# Patient Record
Sex: FEMALE | Race: WHITE | Employment: FULL TIME | URBAN - METROPOLITAN AREA
[De-identification: names, ages, dates, MRNs, and addresses within clinical notes are randomized per-mention and may not be internally consistent; named-entity substitution may affect disease eponyms.]

---

## 2017-05-04 ENCOUNTER — HOSPITAL ENCOUNTER (OUTPATIENT)
Dept: PREADMISSION TESTING | Age: 57
Discharge: HOME | End: 2017-05-04
Payer: COMMERCIAL

## 2017-05-04 VITALS
DIASTOLIC BLOOD PRESSURE: 74 MMHG | WEIGHT: 240 LBS | TEMPERATURE: 97.7 F | BODY MASS INDEX: 39.99 KG/M2 | RESPIRATION RATE: 18 BRPM | HEART RATE: 51 BPM | HEIGHT: 65 IN | SYSTOLIC BLOOD PRESSURE: 119 MMHG

## 2017-05-04 LAB
ALBUMIN SERPL BCP-MCNC: 3.5 G/DL (ref 3.5–5)
ALBUMIN/GLOB SERPL: 1 {RATIO} (ref 1.1–2.2)
ALP SERPL-CCNC: 87 U/L (ref 45–117)
ALT SERPL-CCNC: 16 U/L (ref 12–78)
ANION GAP BLD CALC-SCNC: 6 MMOL/L (ref 5–15)
APPEARANCE UR: CLEAR
APTT PPP: 28.7 SEC (ref 22.1–32.5)
AST SERPL W P-5'-P-CCNC: 10 U/L (ref 15–37)
BACTERIA URNS QL MICRO: NEGATIVE /HPF
BASOPHILS # BLD AUTO: 0 K/UL (ref 0–0.1)
BASOPHILS # BLD: 0 % (ref 0–1)
BILIRUB SERPL-MCNC: 0.2 MG/DL (ref 0.2–1)
BILIRUB UR QL: NEGATIVE
BUN SERPL-MCNC: 11 MG/DL (ref 6–20)
BUN/CREAT SERPL: 14 (ref 12–20)
CALCIUM SERPL-MCNC: 9 MG/DL (ref 8.5–10.1)
CHLORIDE SERPL-SCNC: 103 MMOL/L (ref 97–108)
CO2 SERPL-SCNC: 32 MMOL/L (ref 21–32)
COLOR UR: NORMAL
CREAT SERPL-MCNC: 0.81 MG/DL (ref 0.55–1.02)
EOSINOPHIL # BLD: 0.1 K/UL (ref 0–0.4)
EOSINOPHIL NFR BLD: 1 % (ref 0–7)
EPITH CASTS URNS QL MICRO: NORMAL /LPF
ERYTHROCYTE [DISTWIDTH] IN BLOOD BY AUTOMATED COUNT: 14.7 % (ref 11.5–14.5)
GLOBULIN SER CALC-MCNC: 3.5 G/DL (ref 2–4)
GLUCOSE SERPL-MCNC: 98 MG/DL (ref 65–100)
GLUCOSE UR STRIP.AUTO-MCNC: NEGATIVE MG/DL
HCT VFR BLD AUTO: 35.8 % (ref 35–47)
HGB BLD-MCNC: 11.2 G/DL (ref 11.5–16)
HGB UR QL STRIP: NEGATIVE
HYALINE CASTS URNS QL MICRO: NORMAL /LPF (ref 0–5)
INR PPP: 1 (ref 0.9–1.1)
KETONES UR QL STRIP.AUTO: NEGATIVE MG/DL
LEUKOCYTE ESTERASE UR QL STRIP.AUTO: NEGATIVE
LYMPHOCYTES # BLD AUTO: 39 % (ref 12–49)
LYMPHOCYTES # BLD: 3.2 K/UL (ref 0.8–3.5)
MCH RBC QN AUTO: 26 PG (ref 26–34)
MCHC RBC AUTO-ENTMCNC: 31.3 G/DL (ref 30–36.5)
MCV RBC AUTO: 83.1 FL (ref 80–99)
MONOCYTES # BLD: 0.6 K/UL (ref 0–1)
MONOCYTES NFR BLD AUTO: 7 % (ref 5–13)
NEUTS SEG # BLD: 4.3 K/UL (ref 1.8–8)
NEUTS SEG NFR BLD AUTO: 53 % (ref 32–75)
NITRITE UR QL STRIP.AUTO: NEGATIVE
PH UR STRIP: 7 [PH] (ref 5–8)
PLATELET # BLD AUTO: 368 K/UL (ref 150–400)
POTASSIUM SERPL-SCNC: 4.3 MMOL/L (ref 3.5–5.1)
PROT SERPL-MCNC: 7 G/DL (ref 6.4–8.2)
PROT UR STRIP-MCNC: NEGATIVE MG/DL
PROTHROMBIN TIME: 10 SEC (ref 9–11.1)
RBC # BLD AUTO: 4.31 M/UL (ref 3.8–5.2)
RBC #/AREA URNS HPF: NORMAL /HPF (ref 0–5)
SODIUM SERPL-SCNC: 141 MMOL/L (ref 136–145)
SP GR UR REFRACTOMETRY: 1.02 (ref 1–1.03)
THERAPEUTIC RANGE,PTTT: NORMAL SECS (ref 58–77)
UROBILINOGEN UR QL STRIP.AUTO: 0.2 EU/DL (ref 0.2–1)
WBC # BLD AUTO: 8.2 K/UL (ref 3.6–11)
WBC URNS QL MICRO: NORMAL /HPF (ref 0–4)

## 2017-05-04 PROCEDURE — 36415 COLL VENOUS BLD VENIPUNCTURE: CPT | Performed by: OBSTETRICS & GYNECOLOGY

## 2017-05-04 PROCEDURE — 85025 COMPLETE CBC W/AUTO DIFF WBC: CPT | Performed by: OBSTETRICS & GYNECOLOGY

## 2017-05-04 PROCEDURE — 85730 THROMBOPLASTIN TIME PARTIAL: CPT | Performed by: OBSTETRICS & GYNECOLOGY

## 2017-05-04 PROCEDURE — 85610 PROTHROMBIN TIME: CPT | Performed by: OBSTETRICS & GYNECOLOGY

## 2017-05-04 PROCEDURE — 87086 URINE CULTURE/COLONY COUNT: CPT | Performed by: OBSTETRICS & GYNECOLOGY

## 2017-05-04 PROCEDURE — 80053 COMPREHEN METABOLIC PANEL: CPT | Performed by: OBSTETRICS & GYNECOLOGY

## 2017-05-04 PROCEDURE — 81001 URINALYSIS AUTO W/SCOPE: CPT | Performed by: OBSTETRICS & GYNECOLOGY

## 2017-05-04 RX ORDER — ALPRAZOLAM 0.5 MG/1
0.5 TABLET ORAL
COMMUNITY
End: 2022-05-09

## 2017-05-04 RX ORDER — METOPROLOL SUCCINATE 25 MG/1
12.5 TABLET, EXTENDED RELEASE ORAL
COMMUNITY
End: 2022-05-08

## 2017-05-04 RX ORDER — DICLOFENAC SODIUM 10 MG/G
GEL TOPICAL
COMMUNITY
End: 2018-01-18

## 2017-05-04 RX ORDER — LEVOCETIRIZINE DIHYDROCHLORIDE 5 MG/1
TABLET, FILM COATED ORAL
COMMUNITY
End: 2022-05-08

## 2017-05-04 RX ORDER — DICYCLOMINE HYDROCHLORIDE 10 MG/1
10 CAPSULE ORAL 3 TIMES DAILY
COMMUNITY
End: 2018-01-18

## 2017-05-04 RX ORDER — DEXLANSOPRAZOLE 60 MG/1
CAPSULE, DELAYED RELEASE ORAL DAILY
COMMUNITY
End: 2022-05-08

## 2017-05-04 RX ORDER — CITALOPRAM 40 MG/1
40 TABLET, FILM COATED ORAL EVERY EVENING
COMMUNITY
End: 2022-07-19 | Stop reason: ALTCHOICE

## 2017-05-04 RX ORDER — TEMAZEPAM 30 MG/1
30 CAPSULE ORAL
COMMUNITY

## 2017-05-05 LAB
BACTERIA SPEC CULT: NORMAL
CC UR VC: NORMAL
SERVICE CMNT-IMP: NORMAL

## 2017-05-17 ENCOUNTER — ANESTHESIA EVENT (OUTPATIENT)
Dept: SURGERY | Age: 57
End: 2017-05-17
Payer: COMMERCIAL

## 2017-05-18 ENCOUNTER — HOSPITAL ENCOUNTER (OUTPATIENT)
Age: 57
Setting detail: OUTPATIENT SURGERY
Discharge: HOME | End: 2017-05-18
Attending: OBSTETRICS & GYNECOLOGY | Admitting: OBSTETRICS & GYNECOLOGY
Payer: COMMERCIAL

## 2017-05-18 ENCOUNTER — ANESTHESIA (OUTPATIENT)
Dept: SURGERY | Age: 57
End: 2017-05-18
Payer: COMMERCIAL

## 2017-05-18 ENCOUNTER — APPOINTMENT (OUTPATIENT)
Dept: GENERAL RADIOLOGY | Age: 57
End: 2017-05-18
Attending: OBSTETRICS & GYNECOLOGY
Payer: COMMERCIAL

## 2017-05-18 VITALS
SYSTOLIC BLOOD PRESSURE: 114 MMHG | HEART RATE: 59 BPM | WEIGHT: 240 LBS | OXYGEN SATURATION: 95 % | DIASTOLIC BLOOD PRESSURE: 87 MMHG | RESPIRATION RATE: 17 BRPM | HEIGHT: 65 IN | TEMPERATURE: 98 F | BODY MASS INDEX: 39.99 KG/M2

## 2017-05-18 PROCEDURE — 77030031139 HC SUT VCRL2 J&J -A: Performed by: OBSTETRICS & GYNECOLOGY

## 2017-05-18 PROCEDURE — 77030032490 HC SLV COMPR SCD KNE COVD -B: Performed by: OBSTETRICS & GYNECOLOGY

## 2017-05-18 PROCEDURE — 74011000250 HC RX REV CODE- 250

## 2017-05-18 PROCEDURE — 74011250636 HC RX REV CODE- 250/636: Performed by: OBSTETRICS & GYNECOLOGY

## 2017-05-18 PROCEDURE — 76010000149 HC OR TIME 1 TO 1.5 HR: Performed by: OBSTETRICS & GYNECOLOGY

## 2017-05-18 PROCEDURE — 76060000033 HC ANESTHESIA 1 TO 1.5 HR: Performed by: OBSTETRICS & GYNECOLOGY

## 2017-05-18 PROCEDURE — 76210000020 HC REC RM PH II FIRST 0.5 HR: Performed by: OBSTETRICS & GYNECOLOGY

## 2017-05-18 PROCEDURE — C1787 PATIENT PROGR, NEUROSTIM: HCPCS | Performed by: OBSTETRICS & GYNECOLOGY

## 2017-05-18 PROCEDURE — 74011250636 HC RX REV CODE- 250/636

## 2017-05-18 PROCEDURE — 77030012020 HC ANTENA NEURSTM MEDT -B: Performed by: OBSTETRICS & GYNECOLOGY

## 2017-05-18 PROCEDURE — 73501 X-RAY EXAM HIP UNI 1 VIEW: CPT

## 2017-05-18 PROCEDURE — 74011000258 HC RX REV CODE- 258: Performed by: OBSTETRICS & GYNECOLOGY

## 2017-05-18 PROCEDURE — C1767 GENERATOR, NEURO NON-RECHARG: HCPCS | Performed by: OBSTETRICS & GYNECOLOGY

## 2017-05-18 PROCEDURE — 74011000250 HC RX REV CODE- 250: Performed by: OBSTETRICS & GYNECOLOGY

## 2017-05-18 PROCEDURE — 77030002933 HC SUT MCRYL J&J -A: Performed by: OBSTETRICS & GYNECOLOGY

## 2017-05-18 PROCEDURE — 74011250637 HC RX REV CODE- 250/637

## 2017-05-18 PROCEDURE — C1778 LEAD, NEUROSTIMULATOR: HCPCS | Performed by: OBSTETRICS & GYNECOLOGY

## 2017-05-18 PROCEDURE — 76000 FLUOROSCOPY <1 HR PHYS/QHP: CPT

## 2017-05-18 PROCEDURE — 77030011640 HC PAD GRND REM COVD -A: Performed by: OBSTETRICS & GYNECOLOGY

## 2017-05-18 PROCEDURE — 77030013079 HC BLNKT BAIR HGGR 3M -A: Performed by: NURSE ANESTHETIST, CERTIFIED REGISTERED

## 2017-05-18 PROCEDURE — 77030018846 HC SOL IRR STRL H20 ICUM -A: Performed by: OBSTETRICS & GYNECOLOGY

## 2017-05-18 PROCEDURE — 76210000000 HC OR PH I REC 2 TO 2.5 HR: Performed by: OBSTETRICS & GYNECOLOGY

## 2017-05-18 PROCEDURE — C1894 INTRO/SHEATH, NON-LASER: HCPCS | Performed by: OBSTETRICS & GYNECOLOGY

## 2017-05-18 DEVICE — GENERATOR INTERSTIM X --: Type: IMPLANTABLE DEVICE | Site: BUTTOCKS | Status: FUNCTIONAL

## 2017-05-18 RX ORDER — SODIUM CHLORIDE, SODIUM LACTATE, POTASSIUM CHLORIDE, CALCIUM CHLORIDE 600; 310; 30; 20 MG/100ML; MG/100ML; MG/100ML; MG/100ML
INJECTION, SOLUTION INTRAVENOUS
Status: DISCONTINUED | OUTPATIENT
Start: 2017-05-18 | End: 2017-05-18 | Stop reason: HOSPADM

## 2017-05-18 RX ORDER — ONDANSETRON 2 MG/ML
INJECTION INTRAMUSCULAR; INTRAVENOUS AS NEEDED
Status: DISCONTINUED | OUTPATIENT
Start: 2017-05-18 | End: 2017-05-18 | Stop reason: HOSPADM

## 2017-05-18 RX ORDER — MORPHINE SULFATE 10 MG/ML
2 INJECTION, SOLUTION INTRAMUSCULAR; INTRAVENOUS
Status: DISCONTINUED | OUTPATIENT
Start: 2017-05-18 | End: 2017-05-18 | Stop reason: HOSPADM

## 2017-05-18 RX ORDER — FENTANYL CITRATE 50 UG/ML
25 INJECTION, SOLUTION INTRAMUSCULAR; INTRAVENOUS
Status: DISCONTINUED | OUTPATIENT
Start: 2017-05-18 | End: 2017-05-18 | Stop reason: HOSPADM

## 2017-05-18 RX ORDER — LIDOCAINE HYDROCHLORIDE 10 MG/ML
0.1 INJECTION, SOLUTION EPIDURAL; INFILTRATION; INTRACAUDAL; PERINEURAL AS NEEDED
Status: DISCONTINUED | OUTPATIENT
Start: 2017-05-18 | End: 2017-05-18 | Stop reason: HOSPADM

## 2017-05-18 RX ORDER — LIDOCAINE HYDROCHLORIDE AND EPINEPHRINE 10; 10 MG/ML; UG/ML
INJECTION, SOLUTION INFILTRATION; PERINEURAL AS NEEDED
Status: DISCONTINUED | OUTPATIENT
Start: 2017-05-18 | End: 2017-05-18 | Stop reason: HOSPADM

## 2017-05-18 RX ORDER — SODIUM CHLORIDE 0.9 % (FLUSH) 0.9 %
5-10 SYRINGE (ML) INJECTION AS NEEDED
Status: DISCONTINUED | OUTPATIENT
Start: 2017-05-18 | End: 2017-05-18 | Stop reason: HOSPADM

## 2017-05-18 RX ORDER — HYDROMORPHONE HYDROCHLORIDE 1 MG/ML
0.2 INJECTION, SOLUTION INTRAMUSCULAR; INTRAVENOUS; SUBCUTANEOUS
Status: DISCONTINUED | OUTPATIENT
Start: 2017-05-18 | End: 2017-05-18 | Stop reason: HOSPADM

## 2017-05-18 RX ORDER — SODIUM CHLORIDE 0.9 % (FLUSH) 0.9 %
5-10 SYRINGE (ML) INJECTION EVERY 8 HOURS
Status: DISCONTINUED | OUTPATIENT
Start: 2017-05-18 | End: 2017-05-18 | Stop reason: HOSPADM

## 2017-05-18 RX ORDER — SODIUM CHLORIDE, SODIUM LACTATE, POTASSIUM CHLORIDE, CALCIUM CHLORIDE 600; 310; 30; 20 MG/100ML; MG/100ML; MG/100ML; MG/100ML
50 INJECTION, SOLUTION INTRAVENOUS CONTINUOUS
Status: DISCONTINUED | OUTPATIENT
Start: 2017-05-18 | End: 2017-05-18 | Stop reason: HOSPADM

## 2017-05-18 RX ORDER — PROPOFOL 10 MG/ML
INJECTION, EMULSION INTRAVENOUS AS NEEDED
Status: DISCONTINUED | OUTPATIENT
Start: 2017-05-18 | End: 2017-05-18 | Stop reason: HOSPADM

## 2017-05-18 RX ORDER — SCOLOPAMINE TRANSDERMAL SYSTEM 1 MG/1
PATCH, EXTENDED RELEASE TRANSDERMAL AS NEEDED
Status: DISCONTINUED | OUTPATIENT
Start: 2017-05-18 | End: 2017-05-18 | Stop reason: HOSPADM

## 2017-05-18 RX ORDER — METRONIDAZOLE 500 MG/100ML
500 INJECTION, SOLUTION INTRAVENOUS
Status: COMPLETED | OUTPATIENT
Start: 2017-05-18 | End: 2017-05-18

## 2017-05-18 RX ORDER — FENTANYL CITRATE 50 UG/ML
INJECTION, SOLUTION INTRAMUSCULAR; INTRAVENOUS AS NEEDED
Status: DISCONTINUED | OUTPATIENT
Start: 2017-05-18 | End: 2017-05-18 | Stop reason: HOSPADM

## 2017-05-18 RX ORDER — MIDAZOLAM HYDROCHLORIDE 1 MG/ML
INJECTION, SOLUTION INTRAMUSCULAR; INTRAVENOUS AS NEEDED
Status: DISCONTINUED | OUTPATIENT
Start: 2017-05-18 | End: 2017-05-18 | Stop reason: HOSPADM

## 2017-05-18 RX ORDER — DEXMEDETOMIDINE HYDROCHLORIDE 4 UG/ML
INJECTION, SOLUTION INTRAVENOUS AS NEEDED
Status: DISCONTINUED | OUTPATIENT
Start: 2017-05-18 | End: 2017-05-18 | Stop reason: HOSPADM

## 2017-05-18 RX ORDER — PROPOFOL 10 MG/ML
INJECTION, EMULSION INTRAVENOUS
Status: DISCONTINUED | OUTPATIENT
Start: 2017-05-18 | End: 2017-05-18 | Stop reason: HOSPADM

## 2017-05-18 RX ORDER — GENTAMICIN SULFATE 80 MG/100ML
80 INJECTION, SOLUTION INTRAVENOUS
Status: DISCONTINUED | OUTPATIENT
Start: 2017-05-18 | End: 2017-05-18 | Stop reason: SDUPTHER

## 2017-05-18 RX ORDER — ONDANSETRON 2 MG/ML
4 INJECTION INTRAMUSCULAR; INTRAVENOUS AS NEEDED
Status: DISCONTINUED | OUTPATIENT
Start: 2017-05-18 | End: 2017-05-18 | Stop reason: HOSPADM

## 2017-05-18 RX ADMIN — DEXMEDETOMIDINE HYDROCHLORIDE 4 MCG: 4 INJECTION, SOLUTION INTRAVENOUS at 13:39

## 2017-05-18 RX ADMIN — FENTANYL CITRATE 25 MCG: 50 INJECTION, SOLUTION INTRAMUSCULAR; INTRAVENOUS at 13:13

## 2017-05-18 RX ADMIN — DEXMEDETOMIDINE HYDROCHLORIDE 4 MCG: 4 INJECTION, SOLUTION INTRAVENOUS at 13:09

## 2017-05-18 RX ADMIN — SODIUM CHLORIDE, SODIUM LACTATE, POTASSIUM CHLORIDE, CALCIUM CHLORIDE: 600; 310; 30; 20 INJECTION, SOLUTION INTRAVENOUS at 13:00

## 2017-05-18 RX ADMIN — PROPOFOL 20 MG: 10 INJECTION, EMULSION INTRAVENOUS at 13:09

## 2017-05-18 RX ADMIN — FENTANYL CITRATE 25 MCG: 50 INJECTION, SOLUTION INTRAMUSCULAR; INTRAVENOUS at 13:09

## 2017-05-18 RX ADMIN — DEXMEDETOMIDINE HYDROCHLORIDE 4 MCG: 4 INJECTION, SOLUTION INTRAVENOUS at 13:15

## 2017-05-18 RX ADMIN — DEXMEDETOMIDINE HYDROCHLORIDE 4 MCG: 4 INJECTION, SOLUTION INTRAVENOUS at 13:37

## 2017-05-18 RX ADMIN — DEXMEDETOMIDINE HYDROCHLORIDE 4 MCG: 4 INJECTION, SOLUTION INTRAVENOUS at 13:11

## 2017-05-18 RX ADMIN — FENTANYL CITRATE 25 MCG: 50 INJECTION, SOLUTION INTRAMUSCULAR; INTRAVENOUS at 13:38

## 2017-05-18 RX ADMIN — MIDAZOLAM HYDROCHLORIDE 2 MG: 1 INJECTION, SOLUTION INTRAMUSCULAR; INTRAVENOUS at 13:00

## 2017-05-18 RX ADMIN — METRONIDAZOLE 500 MG: 500 INJECTION, SOLUTION INTRAVENOUS at 13:22

## 2017-05-18 RX ADMIN — DEXMEDETOMIDINE HYDROCHLORIDE 4 MCG: 4 INJECTION, SOLUTION INTRAVENOUS at 13:35

## 2017-05-18 RX ADMIN — FENTANYL CITRATE 25 MCG: 50 INJECTION, SOLUTION INTRAMUSCULAR; INTRAVENOUS at 13:40

## 2017-05-18 RX ADMIN — PROPOFOL 20 MG: 10 INJECTION, EMULSION INTRAVENOUS at 13:11

## 2017-05-18 RX ADMIN — DEXMEDETOMIDINE HYDROCHLORIDE 4 MCG: 4 INJECTION, SOLUTION INTRAVENOUS at 13:13

## 2017-05-18 RX ADMIN — ONDANSETRON 4 MG: 2 INJECTION INTRAMUSCULAR; INTRAVENOUS at 13:40

## 2017-05-18 RX ADMIN — DEXMEDETOMIDINE HYDROCHLORIDE 4 MCG: 4 INJECTION, SOLUTION INTRAVENOUS at 13:17

## 2017-05-18 RX ADMIN — PROPOFOL 50 MCG/KG/MIN: 10 INJECTION, EMULSION INTRAVENOUS at 13:11

## 2017-05-18 RX ADMIN — SCOLOPAMINE TRANSDERMAL SYSTEM 1 PATCH: 1 PATCH, EXTENDED RELEASE TRANSDERMAL at 12:16

## 2017-05-18 RX ADMIN — PROPOFOL 20 MG: 10 INJECTION, EMULSION INTRAVENOUS at 13:13

## 2017-05-18 RX ADMIN — GENTAMICIN SULFATE 380 MG: 40 INJECTION, SOLUTION INTRAMUSCULAR; INTRAVENOUS at 13:13

## 2017-05-18 NOTE — BRIEF OP NOTE
BRIEF OPERATIVE NOTE    Date of Procedure: 5/18/2017   Preoperative Diagnosis: Overactive bladder  Postoperative Diagnosis: Overactive bladder  Procedure(s):  INTERSTEM COMPLETE RIGHT SIDE  Surgeon(s) and Role:     * Cristiana Arriola MD - Primary         Assistant Staff:       Surgical Staff:  Circ-1: Sapphire Amanda RN  Scrub Tech-1: Yazan Ashton  Surg Asst-1: Joel Victoria RN  Event Time In   Incision Start 1340   Incision Close 1407     Anesthesia: MAC   Estimated Blood Loss: 5 cc  Specimens: * No specimens in log *   Findings: as above   Complications: none  Implants:   Implant Name Type Inv.  Item Serial No.  Lot No. LRB No. Used Action   [id number] SNS lead 28cm   N/A [employer] [id number] Right 1 Implanted   GENERATOR INTERSTIM II IPG --  - [id number]   GENERATOR INTERSTIM II IPG --  [id number] [employer]   Left 1 Implanted

## 2017-05-18 NOTE — IP AVS SNAPSHOT
[address] [facility] [address] [place] [address] 
[phone] Patient: Cristi Cerna MRN: [id number] :1960 You are allergic to the following Allergen Reactions Fish Containing Products Other (comments) R/T ALLERGY TESTING-NEVER HAD REACTION Iodine Rash Pcn (Penicillins) Rash Vancomycin Rash Recent Documentation Height Weight BMI OB Status Smoking Status 1.651 m 108.9 kg 39.94 kg/m2 Hysterectomy Never Smoker Emergency Contacts Name Discharge Info Relation Home Work Mobile Connor Meza DISCHARGE CAREGIVER [3] Spouse [3] [phone] About your hospitalization You were admitted on:  May 18, 2017 [facility] You were discharged on:  May 18, 2017 Unit phone number:  [phone] Why you were hospitalized Your primary diagnosis was:  Not on File Providers Seen During Your Hospitalizations Provider Role Specialty Primary office phone Nikhil Santos MD Attending Provider Obstetrics & Gynecology [phone] Your Primary Care Physician (PCP) Primary Care Physician Office Phone Office Fax Dana [phone] Follow-up Information Follow up With Details Comments Contact Info [place]   [address] [address]71 [address] [address] [phone] Nikhil Santos MD Schedule an appointment as soon as possible for a visit in 1 week(s) as directed 200 [facility] [address] [place] [address] 
[phone] Current Discharge Medication List  
  
CONTINUE these medications which have NOT CHANGED Dose & Instructions Dispensing Information Comments Morning Noon Evening Bedtime Cholecalciferol (Vitamin D3) 50,000 unit Cap Your last dose was: Your next dose is: Take  by mouth every seven (7) days. ON FRIDAYS Refills:  0  
     
   
   
   
  
 citalopram 40 mg tablet Commonly known as:  Jim Acosta Your last dose was: Your next dose is:    
   
   
 Dose:  40 mg Take 40 mg by mouth every evening. Refills:  0 DEXILANT 60 mg Cpdb Generic drug:  Dexlansoprazole Your last dose was: Your next dose is: Take  by mouth daily. Refills:  0  
     
   
   
   
  
 dicyclomine 10 mg capsule Commonly known as:  BENTYL Your last dose was: Your next dose is:    
   
   
 Dose:  10 mg Take 10 mg by mouth three (3) times daily. Refills:  0  
     
   
   
   
  
 levocetirizine 5 mg tablet Commonly known as:  Joretta Lock Your last dose was: Your next dose is: Take  by mouth nightly. Refills:  0  
     
   
   
   
  
 levothyroxine 100 mcg tablet Commonly known as:  SYNTHROID Your last dose was: Your next dose is:    
   
   
 Dose:  100 mcg Take 100 mcg by mouth nightly. Refills:  0  
     
   
   
   
  
 linaclotide 145 mcg Cap capsule Commonly known as:  Joan Castro Your last dose was: Your next dose is:    
   
   
 Dose:  145 mcg Take 145 mcg by mouth as needed. Refills:  0  
     
   
   
   
  
 lisinopril-hydroCHLOROthiazide 10-12.5 mg per tablet Commonly known as:  Gerdavid Puff Your last dose was: Your next dose is: Take  by mouth daily. Refills:  0  
     
   
   
   
  
 metoprolol succinate 25 mg XL tablet Commonly known as:  TOPROL-XL Your last dose was: Your next dose is:    
   
   
 Dose:  12.5 mg Take 12.5 mg by mouth daily (after dinner). Refills:  0  
     
   
   
   
  
 montelukast 10 mg tablet Commonly known as:  SINGULAIR Your last dose was: Your next dose is:    
   
   
 Dose:  10 mg Take 10 mg by mouth nightly. Refills:  0 QUEtiapine 50 mg tablet Commonly known as:  SEROquel Your last dose was: Your next dose is:    
   
   
 Dose:  50 mg Take 50 mg by mouth nightly. Take at Bedtime as needed for sleep. Refills:  0  
     
   
   
   
  
 temazepam 30 mg capsule Commonly known as:  RESTORIL Your last dose was: Your next dose is:    
   
   
 Dose:  30 mg Take 30 mg by mouth nightly. Refills:  0 VOLTAREN 1 % Gel Generic drug:  diclofenac Your last dose was: Your next dose is:    
   
   
 Apply  to affected area every six (6) hours as needed. Refills:  0  
     
   
   
   
  
 XANAX 0.5 mg tablet Generic drug:  ALPRAZolam  
   
Your last dose was: Your next dose is:    
   
   
 Dose:  0.5 mg Take 0.5 mg by mouth two (2) times daily as needed for Anxiety (PT MAY TAKE 1-2 TABS DAILY AS NEEDED). Refills:  0 Discharge Instructions DISCHARGE SUMMARY from Nurse PATIENT INSTRUCTIONS: 
 
After general anesthesia or intravenous sedation, for 24 hours or while taking prescription Narcotics: · Limit your activities · Do not drive and operate hazardous machinery · Do not make important personal or business decisions · Do  not drink alcoholic beverages · If you have not urinated within 8 hours after discharge, please contact your surgeon on call. Report the following to your surgeon: 
· Excessive pain, swelling, redness or odor of or around the surgical area · Temperature over 100.5 · Nausea and vomiting lasting longer than 4 hours or if unable to take medications · Any signs of decreased circulation or nerve impairment to extremity: change in color, persistent  numbness, tingling, coldness or increase pain · Any questions The discharge information has been reviewed with the patient and spouse. The patient and spouse verbalized understanding. Discharge medications reviewed with the patient and spouse and appropriate educational materials and side effects teaching were provided. Gynecology Discharge Summary Name: Lisa Porras MRN: [id number]  SSN: [id number] YOB: 1960  Age: 55 y.o. Sex: female Admit Date: 5/18/2017 Discharge Date: 5/18/2017 Admitting Physician: Jacobo Pierson MD  
 
Discharge Physician: Bambi Jeff RN  
 
* Admission Diagnoses: URINARY INCONTINENCE * Discharge Diagnoses:  
Hospital Problems as of 5/18/2017  Date Reviewed: 5/26/2016 None * Procedures: Interstem Placement Consults: None * Discharge Disposition: Home Discharge Medications: * Follow-up Care/Patient Instructions: Activity:  No driving while taking pain medication. No heavy lifting for 2 weeks. You may shower in 48 hours. Diet: Resume pre-hospital diet Wound Care: Keep wound clean and dry. Remove tegaderm 48 hours. Follow-up Information None Signed By:  Bambi Jeff RN May 18, 2017 Discharge Instructions Attachments/References MEFS - OXYCODONE/ACETAMINOPHEN (PERCOCET, ROXICET) - (BY MOUTH) (ENGLISH) Discharge Orders None Introducing [facility] HE[facility]! [employer] introduces [employer] patient portal. Now you can access parts of your medical record, email your doctor's office, and request medication refills online. 1. In your internet browser, go to [email or web address] 2. Click on the First Time User? Click Here link in the Sign In box. You will see the New Member Sign Up page. 3. Enter your D[employer]zy[employer] Access Code exactly as it appears below. You will not need to use this code after youve completed the sign-up process. If you do not sign up before the expiration date, you must request a new code. · [employer] Access Code: [email or web address] Expires: 6/20/2017  9:29 AM 
 
 4. Enter the last four digits of your Social Security Number (xxxx) and Date of Birth (mm/dd/yyyy) as indicated and click Submit. You will be taken to the next sign-up page. 5. Create a Game9z ID. This will be your Game9z login ID and cannot be changed, so think of one that is secure and easy to remember. 6. Create a Game9z password. You can change your password at any time. 7. Enter your Password Reset Question and Answer. This can be used at a later time if you forget your password. 8. Enter your e-mail address. You will receive e-mail notification when new information is available in [address] 9. Click Sign Up. You can now view and download portions of your medical record. 10. Click the Download Summary menu link to download a portable copy of your medical information. If you have questions, please visit the Frequently Asked Questions section of the Game9z website. Remember, Game9z is NOT to be used for urgent needs. For medical emergencies, dial 911. Now available from your iPhone and Android! General Information Please provide this summary of care documentation to your next provider. Patient Signature:  ____________________________________________________________ Date:  ____________________________________________________________  
  
[facility] Provider Signature:  ____________________________________________________________ Date:  ____________________________________________________________ More Information Oxycodone/Acetaminophen (Percocet, Roxicet) - (By mouth) Why this medicine is used:  
Treats pain. This medicine contains a narcotic pain reliever. Contact a nurse or doctor right away if you have: 
· Extreme weakness, shallow breathing, slow heartbeat · Sweating or cold, clammy skin · Skin blisters, rash, or peeling Common side effects: 
· Constipation · Nausea, vomiting · Tiredness © 2017 [address] Information is for End User's use only and may not be sold, redistributed or otherwise used for commercial purposes.

## 2017-05-18 NOTE — PERIOP NOTES
Patient interviewed in holding area, identity and procedure verified. SCDs applied prior to anesthesia induction. Pressure preset. Patient placed herself in the prone position on top of pillows on the OR table. Patient secured to the table. Family member contacted at beginning of procedure. Sterile water to sterile field for irrigation.

## 2017-05-18 NOTE — ANESTHESIA PREPROCEDURE EVALUATION
Anesthetic History     PONV          Review of Systems / Medical History  Patient summary reviewed, nursing notes reviewed and pertinent labs reviewed    Pulmonary                   Neuro/Psych         Psychiatric history    Comments: anxiety Cardiovascular    Hypertension                Comments: Normal sinus rhythm   Normal ECG   When compared with ECG of 12-JAN-2010 13:46,   No significant change was found   Confirmed by Rosy De La Garza MD, Kajal Graff ([id number]) on 5/14/2016 5:39:46 PM    GI/Hepatic/Renal     GERD      PUD     Endo/Other      Hypothyroidism       Other Findings            Physical Exam    Airway  Mallampati: II  TM Distance: > 6 cm  Neck ROM: normal range of motion   Mouth opening: Normal     Cardiovascular    Rhythm: regular  Rate: normal         Dental  No notable dental hx       Pulmonary  Breath sounds clear to auscultation               Abdominal  Abdominal exam normal       Other Findings            Anesthetic Plan    ASA: 2  Anesthesia type: MAC          Induction: Intravenous  Anesthetic plan and risks discussed with: Patient

## 2017-05-18 NOTE — DISCHARGE INSTRUCTIONS
DISCHARGE SUMMARY from Nurse        PATIENT INSTRUCTIONS:    After general anesthesia or intravenous sedation, for 24 hours or while taking prescription Narcotics:  · Limit your activities  · Do not drive and operate hazardous machinery  · Do not make important personal or business decisions  · Do  not drink alcoholic beverages  · If you have not urinated within 8 hours after discharge, please contact your surgeon on call. Report the following to your surgeon:  · Excessive pain, swelling, redness or odor of or around the surgical area  · Temperature over 100.5  · Nausea and vomiting lasting longer than 4 hours or if unable to take medications  · Any signs of decreased circulation or nerve impairment to extremity: change in color, persistent  numbness, tingling, coldness or increase pain  · Any questions    The discharge information has been reviewed with the patient and spouse. The patient and spouse verbalized understanding. Discharge medications reviewed with the patient and spouse and appropriate educational materials and side effects teaching were provided. Gynecology Discharge Summary     Name: Justyn Kothari MRN: [id number]  SSN: [id number]    YOB: 1960  Age: 55 y.o. Sex: female      Admit Date: 5/18/2017    Discharge Date: 5/18/2017      Admitting Physician: Killian Solomon MD     Discharge Physician: Shant Lugo RN     * Admission Diagnoses: URINARY INCONTINENCE    * Discharge Diagnoses:   Hospital Problems as of 5/18/2017  Date Reviewed: 5/26/2016    None           * Procedures: Interstem Placement    Consults: None      * Discharge Disposition: Home    Discharge Medications:          * Follow-up Care/Patient Instructions: Activity:  No driving while taking pain medication. No heavy lifting for 2 weeks. You may shower in 48 hours. Diet: Resume pre-hospital diet  Wound Care: Keep wound clean and dry. Remove tegaderm 48 hours.     Follow-up Information     None Signed By:  Shant Lugo RN     May 18, 2017

## 2017-05-18 NOTE — ANESTHESIA POSTPROCEDURE EVALUATION
Post-Anesthesia Evaluation and Assessment    Patient: Justyn Kothari MRN: [id number]  SSN: [id number]    YOB: 1960  Age: 55 y.o. Sex: female       Cardiovascular Function/Vital Signs  Visit Vitals    /50 (BP 1 Location: Right arm, BP Patient Position: At rest)    Pulse 69    Temp 36.8 °C (98.3 °F)    Resp 17    Ht 5' 5\" (1.651 m)    Wt 108.9 kg (240 lb)    SpO2 97%    BMI 39.94 kg/m2       Patient is status post total IV anesthesia anesthesia for Procedure(s):  INTERSTEM COMPLETE RIGHT SIDE. Nausea/Vomiting: None    Postoperative hydration reviewed and adequate. Pain:  Pain Scale 1: Numeric (0 - 10) (05/18/17 1420)  Pain Intensity 1: 0 (05/18/17 1420)   Managed    Neurological Status:   Neuro (WDL): Within Defined Limits (05/18/17 1420)  Neuro  LUE Motor Response: Purposeful (05/18/17 1420)  LLE Motor Response: Purposeful (05/18/17 1420)  RUE Motor Response: Purposeful (05/18/17 1420)  RLE Motor Response: Purposeful (05/18/17 1420)   At baseline    Mental Status and Level of Consciousness: Arousable    Pulmonary Status:   O2 Device: CO2 nasal cannula (05/18/17 1420)   Adequate oxygenation and airway patent    Complications related to anesthesia: None    Post-anesthesia assessment completed.  No concerns    Signed By: Saman Cid MD     May 18, 2017

## 2017-05-19 NOTE — OP NOTES
[address] [facility] [address], [address]   OP NOTE       Name:  Alexsandra Munoz   MR#:  [id number]   :  1960   Account #:  [de-identified]    Surgery Date:  2017   Date of Adm:  2017       DATE OF OPERATION: 2017. PREOPERATIVE DIAGNOSIS: Overactive bladder, unresponsive to   conservative therapy. POSTOPERATIVE DIAGNOSIS: Overactive bladder, unresponsive to   conservative therapy. PROCEDURE PERFORMED: InterStim complete to the right side. SURGEON: Lisa Wren. MD [place].    ESTIMATED BLOOD LOSS: 5 mL. SPECIMENS REMOVED: None. COMPLICATIONS: None. ANESTHESIA: Sedation and local.    DESCRIPTION OF PROCEDURE: The patient was taken to the OR,   sedated, placed in the prone position, and sterilely prepped in the   usual fashion with her toes allowed to dangle freely over the edges of   the table. Buttocks were taped apart. The S3 foramen was then identified with a combination of superficial   landmarks and cross-hairs technique under fluoroscopy. Attention was turned towards the right side as the patient had had a   superior clinical response on peripheral nerve evaluation on the right   side. The test needle was placed into the S3 foramen and stimulated. There was noted to be good anal yana and great toe flexion   consistent with good localization of the S1 nerve root. The stylet of the   needle was withdrawn. The guidewire was placed and the needle was   withdrawn. The incision was expanded with a 15 blade. The dilator was   placed to its proper depth under fluoroscopic guidance. The stylet of   the dilator and guidewire were removed and the permanent lead wire   was placed under fluoroscopic guidance into its proper position and   stimulated. There was noted to be good anal yana and great toe   flexion in all 4 leads, again consistent with good localization of the S3   nerve root.     The styloid of the permanent lead wire and the dilator were removed   under live fluoroscopy, ensuring stable position of the permanent lead   wire. A 3 cm transverse incision was then created inferior to the iliac   crest on the left upper buttock after being infiltrated with 1% lidocaine   and epinephrine. This was carried down to the level of the gluteal   fascia and extended 3 cm x 3 cm at that level. The distal end of the   lead was then brought into the buttock pocket with the tunneling tool. The distal end was attached to the IPG and placed into its proper   orientation within the pocket. Intraoperative programming was performed, the case IPG and lead   were all noted to be functioning appropriately. The wound was   irrigated. The dermis was closed with interrupted 2-0 Vicryl suture. Both skin incisions were closed with 4-0 Monocryl and covered in   Dermabond. She was returned to the supine position in stable   condition, having tolerated the procedure well. She was transferred to   postanesthesia care. Instrument and lap counts were correct x2.         MD POORNIMA Brooks / Brent Jeffery   D:  05/18/2017   18:15   T:  05/19/2017   02:12   Job #:  [id number]     Tamra Loyd MD, [facility], [place], [facility]     Lakeisha Larsen MD, [address], [place], [address] [facility]

## 2018-01-18 ENCOUNTER — HOSPITAL ENCOUNTER (EMERGENCY)
Age: 58
Discharge: HOME | End: 2018-01-18
Attending: FAMILY MEDICINE

## 2018-01-18 VITALS
TEMPERATURE: 97.5 F | RESPIRATION RATE: 16 BRPM | SYSTOLIC BLOOD PRESSURE: 129 MMHG | DIASTOLIC BLOOD PRESSURE: 78 MMHG | OXYGEN SATURATION: 97 % | WEIGHT: 240 LBS | HEIGHT: 65 IN | HEART RATE: 65 BPM | BODY MASS INDEX: 39.99 KG/M2

## 2018-01-18 DIAGNOSIS — R05.9 COUGH: ICD-10-CM

## 2018-01-18 DIAGNOSIS — R06.2 WHEEZE: ICD-10-CM

## 2018-01-18 DIAGNOSIS — J01.90 ACUTE BACTERIAL SINUSITIS: Primary | ICD-10-CM

## 2018-01-18 DIAGNOSIS — B96.89 ACUTE BACTERIAL SINUSITIS: Primary | ICD-10-CM

## 2018-01-18 RX ORDER — HYOSCYAMINE SULFATE 0.12 MG/1
0.12 TABLET SUBLINGUAL
COMMUNITY
End: 2022-05-08

## 2018-01-18 RX ORDER — PREDNISONE 20 MG/1
TABLET ORAL
Qty: 8 TAB | Refills: 0 | Status: SHIPPED | OUTPATIENT
Start: 2018-01-18 | End: 2022-07-19 | Stop reason: ALTCHOICE

## 2018-01-18 RX ORDER — DOXYCYCLINE HYCLATE 100 MG
100 TABLET ORAL 2 TIMES DAILY
Qty: 14 TAB | Refills: 0 | Status: SHIPPED | OUTPATIENT
Start: 2018-01-18 | End: 2018-01-25

## 2018-01-18 NOTE — LETTER
[facility] 
[address] [address] 
[phone] Work/School Note Date: 1/18/2018 To Whom It May concern: 
 
Edvin Allan was seen and treated today in the emergency room by the following provider(s): 
Nurse Practitioner: Miko Ferrara NP. Edvin Allan may return to work 1- Sincerely, Deon Flowers

## 2018-01-18 NOTE — UC PROVIDER NOTE
HPI Comments:   Here for sinus infection. Sinus pain pressure both cheeks with nasal discharge and congestion for 8 or 9 days. Tried mucinex without any relief. Associated dry to wheezy cough. No asthma history but promotes allergies sometimes makes her wheeze. No fever, chills body aches or severe headache. Symptoms constant without identified aggravating or alleviating factors. Hx significant for: GERD, allergic rhinitis, IBS, HTN, hashimotos, arrythmia    Patient is a 65 y.o. female presenting with cold symptoms. Cold Symptoms    Pertinent negatives include no chest pain, no nausea, no vomiting and no wheezing.         Past Medical History:   Diagnosis Date    Adverse effect of anesthesia     SOMETIMES SLOW TO WAKE UP     Anxiety     Arrhythmia     MURMUR, PALPITATIONS    Arthritis     OSTEOARTHRITIS IN HANDS    Autoimmune disease ([address]     HASHIMOTO'S    GERD (gastroesophageal reflux disease)     Heart murmur     HTN (hypertension)     IBS (irritable bowel syndrome)     Ileus (HCC) 2017    Insomnia     Nausea & vomiting     Psychiatric disorder     AND ANXIETY    PUD (peptic ulcer disease)     Thyroid nodule     Vitamin D deficiency         Past Surgical History:   Procedure Laterality Date    HX CATARACT REMOVAL Bilateral     HX CHOLECYSTECTOMY      HX COLONOSCOPY      HX ENDOSCOPY      HX HEART CATHETERIZATION      HX OTHER SURGICAL      bladder slings    HX NONA AND BSO      Total - age 37   [facility] TONSIL AND ADENOIDECTOMY      HX TUBAL LIGATION      HX UROLOGICAL  05/2016    CYSTOCELE AND RECTOCELE REPAIR, BLADDER SLING         Family History   Problem Relation Age of Onset    Cancer Father      PROSTATE, BLADDER WITH METS    Heart Attack Father     Cancer Mother      oral cancer    Hypertension Mother     Arthritis-osteo Mother     Heart Attack Brother 39    Anesth Problems Neg Hx         Social History     Social History    Marital status:      Spouse name: N/A   Angélica Nichols Number of children: N/A    Years of education: N/A     Occupational History    Not on file. Social History Main Topics    Smoking status: Never Smoker    Smokeless tobacco: Never Used    Alcohol use No    Drug use: No    Sexual activity: Not on file     Other Topics Concern    Not on file     Social History Narrative                ALLERGIES: Fish containing products; Iodine; Pcn [penicillins]; and Vancomycin    Review of Systems   Respiratory: Negative for shortness of breath and wheezing. Cardiovascular: Negative for chest pain, palpitations and leg swelling. Gastrointestinal: Negative for nausea and vomiting. All other systems reviewed and are negative. Vitals:    01/18/18 1329   BP: 129/78   Pulse: 65   Resp: 16   Temp: 97.5 °F (36.4 °C)   SpO2: 97%   Weight: 108.9 kg (240 lb)   Height: 5' 5\" (1.651 m)       Physical Exam   Constitutional: She is oriented to person, place, and time. No distress. Non-toxic appearing, well hydrated   HENT:   Mouth/Throat: No oropharyngeal exudate. R and L nasal passage occluded with thick whitishe yellow mucus in left nasal passage. OP clear. TMs normal appearing. Eyes: Conjunctivae and EOM are normal. Pupils are equal, round, and reactive to light. No scleral icterus. Neck: Normal range of motion. Neck supple. Cardiovascular: Normal rate, regular rhythm and normal heart sounds. Exam reveals no gallop and no friction rub. No murmur heard. Pulmonary/Chest: Effort normal and breath sounds normal.   Soft scattered wheeze lung fields bilaterally. No diminished breath sounds   Musculoskeletal: Normal range of motion. Lymphadenopathy:     She has no cervical adenopathy. Neurological: She is alert and oriented to person, place, and time. No cranial nerve deficit. Skin: Skin is warm and dry. No rash noted. She is not diaphoretic. No erythema. No pallor. Psychiatric: She has a normal mood and affect.  Her behavior is normal. Thought content normal.   Nursing note and vitals reviewed. MDM     Differential Diagnosis; Clinical Impression; Plan:       CLINICAL IMPRESSION:  (J01.90,  B96.89) Acute bacterial sinusitis  (primary encounter diagnosis)  (R05) Cough  (R06.2) Wheeze    Orders Placed This Encounter  RX:      predniSONE (DELTASONE) 20 mg tablet      doxycycline (VIBRA-TABS) 100 mg tablet    Plan:  1. See above orders. Will Rx antibiotic given sinus worsening > 7 days  2. Maintain adequate fluid intake. We have reviewed concerning signs/symptoms, normal vs abnormal progression of medical condition and when to seek immediate medical attention. Schedule with PCP or Urgent Care immediately for worsening or new symptoms. See your PCP if there is not at least some improvement in symptoms within the next 4-5 days. You should see your PCP for updates on your routine health maintenance.      Risk of Significant Complications, Morbidity, and/or Mortality:   Presenting problems:  Low  Diagnostic procedures:  Low  Management options:  Low  Progress:   Patient progress:  Stable      Procedures

## 2018-01-18 NOTE — LETTER
NOTIFICATION RETURN TO WORK / SCHOOL 
 
1/18/2018 2:31 PM 
 
Ms. [address] 
[facility] [address] To Whom It May Concern: 
 
Suzy Lloyd is currently under the care of [address] [place] [address]. She will return to work/school on: 01/21/2018 If there are questions or concerns please have the patient contact our office. Sincerely, Lenin Palacios NP

## 2018-01-18 NOTE — DISCHARGE INSTRUCTIONS
Sinusitis: Care Instructions  Your Care Instructions    Sinusitis is an infection of the lining of the sinus cavities in your head. Sinusitis often follows a cold. It causes pain and pressure in your head and face. In most cases, sinusitis gets better on its own in 1 to 2 weeks. But some mild symptoms may last for several weeks. Sometimes antibiotics are needed. Follow-up care is a key part of your treatment and safety. Be sure to make and go to all appointments, and call your doctor if you are having problems. It's also a good idea to know your test results and keep a list of the medicines you take. How can you care for yourself at home? · Take an over-the-counter pain medicine, such as acetaminophen (Tylenol), ibuprofen (Advil, Motrin), or naproxen (Aleve). Read and follow all instructions on the label. · If the doctor prescribed antibiotics, take them as directed. Do not stop taking them just because you feel better. You need to take the full course of antibiotics. · Be careful when taking over-the-counter cold or flu medicines and Tylenol at the same time. Many of these medicines have acetaminophen, which is Tylenol. Read the labels to make sure that you are not taking more than the recommended dose. Too much acetaminophen (Tylenol) can be harmful. · Breathe warm, moist air from a steamy shower, a hot bath, or a sink filled with hot water. Avoid cold, dry air. Using a humidifier in your home may help. Follow the directions for cleaning the machine. · Use saline (saltwater) nasal washes to help keep your nasal passages open and wash out mucus and bacteria. You can buy saline nose drops at a grocery store or drugstore. Or you can make your own at home by adding 1 teaspoon of salt and 1 teaspoon of baking soda to 2 cups of distilled water. If you make your own, fill a bulb syringe with the solution, insert the tip into your nostril, and squeeze gently. [employer] your nose.   · Put a hot, wet towel or a warm gel pack on your face 3 or 4 times a day for 5 to 10 minutes each time. · Try a decongestant nasal spray like oxymetazoline (Afrin). Do not use it for more than 3 days in a row. Using it for more than 3 days can make your congestion worse. When should you call for help? Call your doctor now or seek immediate medical care if:  ? · You have new or worse swelling or redness in your face or around your eyes. ? · You have a new or higher fever. ? Watch closely for changes in your health, and be sure to contact your doctor if:  ? · You have new or worse facial pain. ? · The mucus from your nose becomes thicker (like pus) or has new blood in it. ? · You are not getting better as expected. Where can you learn more? Go to http://jorge-hamilton.info/. Enter N560 in the search box to learn more about \"Sinusitis: Care Instructions. \"  Current as of: May 12, 2017  Content Version: 11.4  © 5937-1884 [employer]. Care instructions adapted under license by [employer] (which disclaims liability or warranty for this information). If you have questions about a medical condition or this instruction, always ask your healthcare professional. [address] any warranty or liability for your use of this information.

## 2022-05-08 ENCOUNTER — OFFICE VISIT (OUTPATIENT)
Dept: URGENT CARE | Age: 62
End: 2022-05-08
Payer: COMMERCIAL

## 2022-05-08 VITALS
HEART RATE: 70 BPM | DIASTOLIC BLOOD PRESSURE: 82 MMHG | HEIGHT: 65 IN | OXYGEN SATURATION: 99 % | WEIGHT: 266 LBS | RESPIRATION RATE: 16 BRPM | TEMPERATURE: 97.6 F | BODY MASS INDEX: 44.32 KG/M2 | SYSTOLIC BLOOD PRESSURE: 150 MMHG

## 2022-05-08 DIAGNOSIS — J06.9 VIRAL URI WITH COUGH: Primary | ICD-10-CM

## 2022-05-08 LAB — SARS-COV-2 PCR, POC: NEGATIVE

## 2022-05-08 PROCEDURE — 87635 SARS-COV-2 COVID-19 AMP PRB: CPT | Performed by: NURSE PRACTITIONER

## 2022-05-08 PROCEDURE — 99203 OFFICE O/P NEW LOW 30 MIN: CPT | Performed by: NURSE PRACTITIONER

## 2022-05-08 RX ORDER — DICLOFENAC SODIUM 75 MG/1
75 TABLET, DELAYED RELEASE ORAL
COMMUNITY

## 2022-05-08 RX ORDER — LOSARTAN POTASSIUM 100 MG/1
100 TABLET ORAL DAILY
COMMUNITY

## 2022-05-08 RX ORDER — AMITRIPTYLINE HYDROCHLORIDE 50 MG/1
50 TABLET, FILM COATED ORAL
COMMUNITY

## 2022-05-08 RX ORDER — LANSOPRAZOLE 30 MG/1
30 CAPSULE, DELAYED RELEASE ORAL
COMMUNITY

## 2022-05-08 RX ORDER — CETIRIZINE HCL 10 MG
10 TABLET ORAL
COMMUNITY

## 2022-05-08 NOTE — LETTER
NOTIFICATION RETURN TO WORK / SCHOOL    5/8/2022 9:55 AM    Ms. Zo Moss  [facility] [address]      To Whom It May Concern:    Zo Moss is currently under the care of [facility]. Please excuse from work. She will return to work/school on: 05/10 OR 05/11/2022     If there are questions or concerns please have the patient contact our office.         Sincerely,      GHE PROVIDER

## 2022-05-08 NOTE — PROGRESS NOTES
Subjective: (As above and below)     The patient/guardian gave verbal consent to treat. Chief Complaint   Patient presents with    Cough     Pt here today with complaint of cough since Friday. Diagnosed with bronchitis by virtual visit on Friday. Given antibiotics and steroids. Has taken one dose of antibiotics and several doses of steroids but is still feeling unwell. Molly Robins is a 62 y.o. female who presents for evaluation of : cough and congestion. Symptom onset 3 days ago . Preceding illness: none. No other identified aggravating or alleviating factors. Symptoms are constant and overall not improving. Cough is dry and tight. Occasional wheezing when she has frequent cough. She was seen virtually 3 days ago. Prescribed omnicef (antibiotic) and prednisone. Told to hold antibiotic as it could be allergy or viral. Cough was worse yesterday so she started antibiotic. She also has used albuterol inhaler at home. Denies severe SOB, fever    Known Exposure to COVID-19: no known      ROS  Review of Systems - negative except as listed above    Reviewed PmHx, RxHx, FmHx, SocHx, AllgHx and updated in chart.   Family History   Problem Relation Age of Onset    Cancer Father         PROSTATE, BLADDER WITH METS    Heart Attack Father     Cancer Mother         oral cancer    Hypertension Mother     OSTEOARTHRITIS Mother     Heart Attack Brother 39    Anesth Problems Neg Hx        Past Medical History:   Diagnosis Date    Adverse effect of anesthesia     SOMETIMES SLOW TO WAKE UP     Anxiety     Arrhythmia     MURMUR, PALPITATIONS    Arthritis     OSTEOARTHRITIS IN HANDS    Autoimmune disease ([address]     HASHIMOTO'S    GERD (gastroesophageal reflux disease)     Heart murmur     HTN (hypertension)     IBS (irritable bowel syndrome)     Ileus (HCC) 2017    Insomnia     Nausea & vomiting     Psychiatric disorder     AND ANXIETY    PUD (peptic ulcer disease)     Thyroid nodule     Vitamin D deficiency       Social History     Socioeconomic History    Marital status:    Tobacco Use    Smoking status: Never Smoker    Smokeless tobacco: Never Used   Substance and Sexual Activity    Alcohol use: No     Alcohol/week: 0.0 standard drinks    Drug use: No          Current Outpatient Medications   Medication Sig    lansoprazole (PREVACID) 30 mg capsule Take 30 mg by mouth Daily (before breakfast).  cetirizine (ZYRTEC) 10 mg tablet Take 10 mg by mouth.  losartan (COZAAR) 100 mg tablet Take 100 mg by mouth daily.  diclofenac EC (VOLTAREN) 75 mg EC tablet Take 75 mg by mouth.  amitriptyline (ELAVIL) 50 mg tablet Take 50 mg by mouth nightly.  predniSONE (DELTASONE) 20 mg tablet Take 2 tabs by mouth one time daily for 4 days. Take with food.  temazepam (RESTORIL) 30 mg capsule Take 30 mg by mouth nightly.  citalopram (CELEXA) 40 mg tablet Take 40 mg by mouth every evening.  ALPRAZolam (XANAX) 0.5 mg tablet Take 0.5 mg by mouth two (2) times daily as needed for Anxiety (PT MAY TAKE 1-2 TABS DAILY AS NEEDED).  levothyroxine (SYNTHROID) 100 mcg tablet Take 125 mcg by mouth nightly.  montelukast (SINGULAIR) 10 mg tablet Take 10 mg by mouth nightly.  QUEtiapine (SEROQUEL) 50 mg tablet Take 50 mg by mouth nightly. Take at Bedtime as needed for sleep. No current facility-administered medications for this visit. Objective:     Vitals:    05/08/22 0932   BP: (!) 150/82   Pulse: 70   Resp: 16   Temp: 97.6 °F (36.4 °C)   SpO2: 99%   Weight: 266 lb (120.7 kg)   Height: 5' 5\" (1.651 m)       Physical Exam  General appearance - appears well hydrated and does not appear toxic, no acute distress  Eyes - EOMs intact. Non injected. No scleral icterus   Ears - no external swelling. TMs normal bilat. Nose - edematous nasal turbinates bilat. No purulent drainage  Mouth - OP clear without swelling, exudate or lesion. Mucus membranes moist. Uvula midline.   Neck/Lymphatics - trachea midline, full AROM, no LAD of neck  Chest - Normal breathing effort no wheeze rales, rhonchi or diminishments bilaterally. Heart - RRR, no murmurs  Skin - no observable rashes or pallor  Neurologic- alert and oriented x 3  Psychiatric- normal mood, behavior and though content. Assessment/ Plan:     1. Viral URI with cough    - POCT COVID-19, SARS-COV-2, PCR    Likely viral illness. Is currently on prednisone, omnicef and albuterol- states cough hasnt improved. On exam lungs are clear. Patient breathing well at rest; no suspicion for pneumonia at this time. Continue meds as prior listed/prescribed. Patient has declined tessalon prescription today. Will test for covid given suspicion of viral illness. Covid 19 test result today NEGATIVE  Will discharge home with close monitoring and follow up. Supportive home care-maintain adequate fluid intake, over the counter Tylenol (for fever, aches, pains, chills), deep breathing exercises        Test Results:  Recent Results (from the past 6 hour(s))   POCT COVID-19, SARS-COV-2, PCR    Collection Time: 05/08/22 10:15 AM   Result Value Ref Range    SARS-COV-2 PCR, POC Negative Negative       Follow up: Follow up with PCP for any new, worsening or changes. Go to ED for worsening.         Cady Nicolas NP

## 2022-05-09 ENCOUNTER — HOSPITAL ENCOUNTER (EMERGENCY)
Age: 62
Discharge: HOME | End: 2022-05-09
Attending: STUDENT IN AN ORGANIZED HEALTH CARE EDUCATION/TRAINING PROGRAM
Payer: COMMERCIAL

## 2022-05-09 ENCOUNTER — APPOINTMENT (OUTPATIENT)
Dept: GENERAL RADIOLOGY | Age: 62
End: 2022-05-09
Attending: EMERGENCY MEDICINE
Payer: COMMERCIAL

## 2022-05-09 VITALS
BODY MASS INDEX: 44.19 KG/M2 | SYSTOLIC BLOOD PRESSURE: 168 MMHG | DIASTOLIC BLOOD PRESSURE: 81 MMHG | WEIGHT: 265.21 LBS | RESPIRATION RATE: 14 BRPM | HEART RATE: 76 BPM | OXYGEN SATURATION: 98 % | TEMPERATURE: 98.2 F | HEIGHT: 65 IN

## 2022-05-09 DIAGNOSIS — J20.9 ACUTE BRONCHITIS, UNSPECIFIED ORGANISM: Primary | ICD-10-CM

## 2022-05-09 PROCEDURE — 71046 X-RAY EXAM CHEST 2 VIEWS: CPT

## 2022-05-09 PROCEDURE — 99283 EMERGENCY DEPT VISIT LOW MDM: CPT

## 2022-05-09 RX ORDER — CODEINE PHOSPHATE AND GUAIFENESIN 10; 100 MG/5ML; MG/5ML
5 SOLUTION ORAL
Qty: 120 ML | Refills: 0 | Status: SHIPPED | OUTPATIENT
Start: 2022-05-09 | End: 2022-05-15

## 2022-05-09 RX ORDER — ONDANSETRON 4 MG/1
4 TABLET, ORALLY DISINTEGRATING ORAL
Qty: 20 TABLET | Refills: 0 | Status: SHIPPED | OUTPATIENT
Start: 2022-05-09

## 2022-05-09 RX ORDER — ALBUTEROL SULFATE 90 UG/1
2 AEROSOL, METERED RESPIRATORY (INHALATION)
Qty: 1 EACH | Refills: 0 | Status: SHIPPED | OUTPATIENT
Start: 2022-05-09

## 2022-05-09 NOTE — ED PROVIDER NOTES
EMERGENCY DEPARTMENT HISTORY AND PHYSICAL EXAM      Date: 5/9/2022  Patient Name: Lorenza Llamas    History of Presenting Illness     Chief Complaint   Patient presents with    Cough     when coughing her ribs hurt 8-10; pt told she has bronchitis this past thursday; pt is on prednisone on friday. was at [facility] yesterday but pt is on third time with bronchitis. History Provided By: Patient    HPI: Lorenza Llamas, 62 y.o. female with history of anxiety, hypertension, IBS, insomnia, hypothyroidism, GERD and others as below presents ambulatory to the ED with cc of 4 days of mild constant cough for which she is found no improvement while taking prednisone, albuterol and cefdinir. She tells me this is the third time this year she has had these symptoms. She has been seen several times and diagnosed with bronchitis. Most recently she was seen at an urgent care yesterday. She tells me yesterday she had a negative COVID-19 test and assures me she is fully vaccinated and booster against COVID-19 the season. There are no known sick contacts. There has been no recent travel. She denies any history of asthma and she does not smoke cigarettes. She denies any chest pain or shortness of breath though she tells me she does have a history of chest pain. She tells me she really wants to go back to work at [employer] where she works as a . She tells me the cough is very disruptive and she has tried NyQuil, Robitussin and [employer]. She tells me she takes Singulair and cetirizine daily for her seasonal allergy symptoms. There are no other complaints, changes, or physical findings at this time. PCP: Adis Brewer MD    Current Outpatient Medications   Medication Sig Dispense Refill    guaiFENesin-codeine (ROBITUSSIN AC) 100-10 mg/5 mL solution Take 5 mL by mouth four (4) times daily as needed for Cough for up to 6 days.  Max Daily Amount: 20 mL. 120 mL 0    ondansetron (ZOFRAN ODT) 4 mg disintegrating tablet Take 1 Tablet by mouth every eight (8) hours as needed for Nausea or Vomiting. 20 Tablet 0    albuterol (PROVENTIL HFA, VENTOLIN HFA, PROAIR HFA) 90 mcg/actuation inhaler Take 2 Puffs by inhalation every four (4) hours as needed for Wheezing, Shortness of Breath or Cough. 1 Each 0    lansoprazole (PREVACID) 30 mg capsule Take 30 mg by mouth Daily (before breakfast).  cetirizine (ZYRTEC) 10 mg tablet Take 10 mg by mouth.  losartan (COZAAR) 100 mg tablet Take 100 mg by mouth daily.  diclofenac EC (VOLTAREN) 75 mg EC tablet Take 75 mg by mouth.  amitriptyline (ELAVIL) 50 mg tablet Take 50 mg by mouth nightly.  predniSONE (DELTASONE) 20 mg tablet Take 2 tabs by mouth one time daily for 4 days. Take with food. 8 Tab 0    temazepam (RESTORIL) 30 mg capsule Take 30 mg by mouth nightly.  citalopram (CELEXA) 40 mg tablet Take 40 mg by mouth every evening.  levothyroxine (SYNTHROID) 100 mcg tablet Take 125 mcg by mouth nightly.  montelukast (SINGULAIR) 10 mg tablet Take 10 mg by mouth nightly.  QUEtiapine (SEROQUEL) 50 mg tablet Take 50 mg by mouth nightly. Take at Bedtime as needed for sleep.         Past History     Past Medical History:  Past Medical History:   Diagnosis Date    Adverse effect of anesthesia     SOMETIMES SLOW TO WAKE UP     Anxiety     Arrhythmia     MURMUR, PALPITATIONS    Arthritis     OSTEOARTHRITIS IN HANDS    Autoimmune disease ([facility] [address]     HASHIMOTO'S    GERD (gastroesophageal reflux disease)     Heart murmur     HTN (hypertension)     IBS (irritable bowel syndrome)     Ileus ([facility] [address] 2017    Insomnia     Nausea & vomiting     Psychiatric disorder     AND ANXIETY    PUD (peptic ulcer disease)     Thyroid nodule     Vitamin D deficiency        Past Surgical History:  Past Surgical History:   Procedure Laterality Date    HX CATARACT REMOVAL Bilateral     HX CHOLECYSTECTOMY      HX COLONOSCOPY      HX ENDOSCOPY      HX HEART CATHETERIZATION      HX OTHER SURGICAL      bladder slings    HX NONA AND BSO      Total - age 37   Ramiro [place] TONSIL AND ADENOIDECTOMY      HX TUBAL LIGATION      HX UROLOGICAL  05/2016    CYSTOCELE AND RECTOCELE REPAIR, BLADDER SLING       Family History:  Family History   Problem Relation Age of Onset    Cancer Father         PROSTATE, BLADDER WITH METS    Heart Attack Father     Cancer Mother         oral cancer    Hypertension Mother     OSTEOARTHRITIS Mother     Heart Attack Brother 39    Anesth Problems Neg Hx        Social History:  Social History     Tobacco Use    Smoking status: Never Smoker    Smokeless tobacco: Never Used   Substance Use Topics    Alcohol use: No     Alcohol/week: 0.0 standard drinks    Drug use: No       Allergies: Allergies   Allergen Reactions    Fish Containing Products Other (comments)     R/T ALLERGY TESTING-NEVER HAD REACTION    Iodine Rash    Pcn [Penicillins] Rash    Vancomycin Rash     Review of Systems   Review of Systems   Constitutional: Negative for fever. Respiratory: Positive for cough and wheezing. Negative for shortness of breath. Cardiovascular: Negative for chest pain. Physical Exam   Physical Exam  Vitals and nursing note reviewed. Constitutional:       General: She is not in acute distress. Appearance: She is well-developed. She is obese. She is not toxic-appearing. HENT:      Head: Normocephalic and atraumatic. Jaw: No trismus. Right Ear: External ear normal.      Left Ear: External ear normal.      Nose: Nose normal.      Mouth/Throat:      Pharynx: Uvula midline. Eyes:      General: No scleral icterus. Conjunctiva/sclera: Conjunctivae normal.      Pupils: Pupils are equal, round, and reactive to light. Cardiovascular:      Rate and Rhythm: Normal rate and regular rhythm. Pulmonary:      Effort: Pulmonary effort is normal. No tachypnea, accessory muscle usage or respiratory distress. Breath sounds:  No decreased breath sounds or wheezing. Abdominal:      Palpations: Abdomen is soft. Tenderness: There is no abdominal tenderness. Musculoskeletal:         General: Normal range of motion. Cervical back: Full passive range of motion without pain and normal range of motion. Skin:     Findings: No rash. Neurological:      Mental Status: She is alert and oriented to person, place, and time. She is not disoriented. GCS: GCS eye subscore is 4. GCS verbal subscore is 5. GCS motor subscore is 6. Cranial Nerves: No cranial nerve deficit. Psychiatric:         Speech: Speech normal.       Diagnostic Study Results     Labs -   No results found for this or any previous visit (from the past 12 hour(s)). Radiologic Studies -   XR CHEST PA LAT   Final Result   No acute process or change compared to the prior exam.            CT Results  (Last 48 hours)    None        CXR Results  (Last 48 hours)               05/09/22 1052  XR CHEST PA LAT Final result    Impression:  No acute process or change compared to the prior exam.           Narrative:  Exam:  2 view chest       Indication: Persistent bronchitis, persistent cough       Comparison to 1/12/2010. PA and lateral views demonstrate normal heart size. There is no acute process in   the lung fields. Mild degenerative changes are seen in the thoracic spine. Medical Decision Making   I am the first provider for this patient. I reviewed the vital signs, available nursing notes, past medical history, past surgical history, family history and social history. Vital Signs-Reviewed the patient's vital signs.   Patient Vitals for the past 12 hrs:   Temp Pulse Resp BP SpO2   05/09/22 1035 98.2 °F (36.8 °C) 76 14 (!) 168/81 98 %       Pulse Oximetry Analysis - 98% on RA    Records Reviewed: Nursing Notes, Old Medical Records, Previous Radiology Studies, Previous Laboratory Studies and     Provider Notes (Medical Decision Making): DDx: Pneumonia, bronchitis, viral URI, bronchospasm    Afebrile and well-appearing. Oxygen saturation approaches 100% on room air. Breathing is unlabored and lungs are clear to auscultation. Chest x-ray is negative for acute process. Recent COVID-19 testing is negative and the patient is fully vaccinated against COVID-19. Patient is currently on oral steroids, albuterol and antibiotics. She tells me she has tried numerous medications for cough to include Tessalon Perles, Robitussin-DM and NyQuil. She tells me her cough is very disruptive and prevents her from sleeping.  is reviewed and reveals she does take temazepam at night for sleeping. Additional testing deferred. Will offer small amount of codeine-based cough medicine and referral to pulmonary medicine. Return precautions for chest pain, shortness of breath, fever, worsening symptoms or any concerns. ED Course:   Initial assessment performed. The patients presenting problems have been discussed, and they are in agreement with the care plan formulated and outlined with them. I have encouraged them to ask questions as they arise throughout their visit. Disposition:  Discharge    PLAN:  1. Discharge Medication List as of 5/9/2022 12:43 PM      START taking these medications    Details   guaiFENesin-codeine (ROBITUSSIN AC) 100-10 mg/5 mL solution Take 5 mL by mouth four (4) times daily as needed for Cough for up to 6 days.  Max Daily Amount: 20 mL., Normal, Disp-120 mL, R-0      ondansetron (ZOFRAN ODT) 4 mg disintegrating tablet Take 1 Tablet by mouth every eight (8) hours as needed for Nausea or Vomiting., Normal, Disp-20 Tablet, R-0      albuterol (PROVENTIL HFA, VENTOLIN HFA, PROAIR HFA) 90 mcg/actuation inhaler Take 2 Puffs by inhalation every four (4) hours as needed for Wheezing, Shortness of Breath or Cough., Normal, Disp-1 Each, R-0         CONTINUE these medications which have NOT CHANGED    Details   lansoprazole (PREVACID) 30 mg capsule Take 30 mg by mouth Daily (before breakfast). , Historical Med      cetirizine (ZYRTEC) 10 mg tablet Take 10 mg by mouth., Historical Med      losartan (COZAAR) 100 mg tablet Take 100 mg by mouth daily. , Historical Med      diclofenac EC (VOLTAREN) 75 mg EC tablet Take 75 mg by mouth., Historical Med      amitriptyline (ELAVIL) 50 mg tablet Take 50 mg by mouth nightly., Historical Med      predniSONE (DELTASONE) 20 mg tablet Take 2 tabs by mouth one time daily for 4 days. Take with food., Normal, Disp-8 Tab, R-0      temazepam (RESTORIL) 30 mg capsule Take 30 mg by mouth nightly., Historical Med      citalopram (CELEXA) 40 mg tablet Take 40 mg by mouth every evening., Historical Med      levothyroxine (SYNTHROID) 100 mcg tablet Take 125 mcg by mouth nightly., Historical Med      montelukast (SINGULAIR) 10 mg tablet Take 10 mg by mouth nightly., Historical Med      QUEtiapine (SEROQUEL) 50 mg tablet Take 50 mg by mouth nightly. Take at Bedtime as needed for sleep. , Historical Med           2. Follow-up Information     Follow up With Specialties Details Why Contact Info    Treva Alexandra MD Internal Medicine Physician, Pulmonary Disease Call  PULMONARY MEDICINE: as needed if symptoms persist or recur [address]  [phone]          Return to ED if worse     Diagnosis     Clinical Impression:   1.  Acute bronchitis, unspecified organism

## 2022-07-19 ENCOUNTER — OFFICE VISIT (OUTPATIENT)
Dept: ORTHOPEDIC SURGERY | Age: 62
End: 2022-07-19
Payer: COMMERCIAL

## 2022-07-19 VITALS — HEIGHT: 65 IN | WEIGHT: 265 LBS | BODY MASS INDEX: 44.15 KG/M2

## 2022-07-19 DIAGNOSIS — M79.672 ACUTE PAIN OF LEFT FOOT: ICD-10-CM

## 2022-07-19 DIAGNOSIS — S92.152A CLOSED DISPLACED AVULSION FRACTURE OF LEFT TALUS, INITIAL ENCOUNTER: Primary | ICD-10-CM

## 2022-07-19 PROCEDURE — 99202 OFFICE O/P NEW SF 15 MIN: CPT | Performed by: ORTHOPAEDIC SURGERY

## 2022-07-19 NOTE — PROGRESS NOTES
Yara Wyatt (: 1960) is a 62 y.o. female, patient,here for evaluation of the following   Chief Complaint   Patient presents with    Foot Pain     left foot pain        ASSESSMENT/PLAN:  Below is the assessment and plan developed based on review of pertinent history, physical exam, labs, studies, and medications. 1. Closed displaced avulsion fracture of left talus, initial encounter  2. Acute pain of left foot    Patient has an avulsion fracture of the hindfoot notable over the dorsum of the talus, the talonavicular joint and the rest of the joints at the tarsal and midtarsal joints are all intact. No other fractures or dislocations seen on the views obtained and also by x-ray, correlates with the findings. We discussed treatment for this injury which is conservative treatment to allow this to heal.  She has a boot already to use so she will continue in the boot weightbearing as tolerated, will return in approximately 4 to 6 weeks and we will get new x-rays of the left foot 3 views nonweightbearing. Return in about 6 weeks (around 2022) for repeat xrays. Allergies   Allergen Reactions    Fish Containing Products Other (comments)     R/T ALLERGY TESTING-NEVER HAD REACTION    Iodine Rash    Pcn [Penicillins] Rash    Vancomycin Rash       Current Outpatient Medications   Medication Sig    ondansetron (ZOFRAN ODT) 4 mg disintegrating tablet Take 1 Tablet by mouth every eight (8) hours as needed for Nausea or Vomiting.  albuterol (PROVENTIL HFA, VENTOLIN HFA, PROAIR HFA) 90 mcg/actuation inhaler Take 2 Puffs by inhalation every four (4) hours as needed for Wheezing, Shortness of Breath or Cough.  lansoprazole (PREVACID) 30 mg capsule Take 30 mg by mouth Daily (before breakfast).  cetirizine (ZYRTEC) 10 mg tablet Take 10 mg by mouth.  losartan (COZAAR) 100 mg tablet Take 100 mg by mouth daily.  diclofenac EC (VOLTAREN) 75 mg EC tablet Take 75 mg by mouth.     amitriptyline (ELAVIL) 50 mg tablet Take 50 mg by mouth nightly.  predniSONE (DELTASONE) 20 mg tablet Take 2 tabs by mouth one time daily for 4 days. Take with food.  temazepam (RESTORIL) 30 mg capsule Take 30 mg by mouth nightly.  citalopram (CELEXA) 40 mg tablet Take 40 mg by mouth every evening.  levothyroxine (SYNTHROID) 100 mcg tablet Take 125 mcg by mouth nightly.  montelukast (SINGULAIR) 10 mg tablet Take 10 mg by mouth nightly.  QUEtiapine (SEROQUEL) 50 mg tablet Take 50 mg by mouth nightly. Take at Bedtime as needed for sleep. No current facility-administered medications for this visit.        Past Medical History:   Diagnosis Date    Adverse effect of anesthesia     SOMETIMES SLOW TO WAKE UP     Anxiety     Arrhythmia     MURMUR, PALPITATIONS    Arthritis     OSTEOARTHRITIS IN HANDS    Autoimmune disease ([facility] [address]     HASHIMOTO'S    GERD (gastroesophageal reflux disease)     Heart murmur     HTN (hypertension)     IBS (irritable bowel syndrome)     Ileus (HCC) 2017    Insomnia     Nausea & vomiting     Psychiatric disorder     AND ANXIETY    PUD (peptic ulcer disease)     Thyroid nodule     Vitamin D deficiency        Past Surgical History:   Procedure Laterality Date    HX CATARACT REMOVAL Bilateral     HX CHOLECYSTECTOMY      HX COLONOSCOPY      HX ENDOSCOPY      HX HEART CATHETERIZATION      HX OTHER SURGICAL      bladder slings    HX NONA AND BSO      Total - age 37   [de-identified] TONSIL AND ADENOIDECTOMY      HX TUBAL LIGATION      HX UROLOGICAL  05/2016    CYSTOCELE AND RECTOCELE REPAIR, BLADDER SLING       Family History   Problem Relation Age of Onset    Cancer Father         PROSTATE, BLADDER WITH METS    Heart Attack Father     Cancer Mother         oral cancer    Hypertension Mother     OSTEOARTHRITIS Mother     Heart Attack Brother 39    Anesth Problems Neg Hx        Social History     Socioeconomic History    Marital status:      Spouse name: Not on file    Number of children: Not on file    Years of education: Not on file    Highest education level: Not on file   Occupational History    Not on file   Tobacco Use    Smoking status: Never Smoker    Smokeless tobacco: Never Used   Vaping Use    Vaping Use: Never used   Substance and Sexual Activity    Alcohol use: No     Alcohol/week: 0.0 standard drinks    Drug use: No    Sexual activity: Not on file   Other Topics Concern    Not on file   Social History Narrative    Not on file     Social Determinants of Health     Financial Resource Strain:     Difficulty of Paying Living Expenses: Not on file   Food Insecurity:     Worried About Running Out of Food in the Last Year: Not on file    Zach of Food in the Last Year: Not on file   Transportation Needs:     Lack of Transportation (Medical): Not on file    Lack of Transportation (Non-Medical): Not on file   Physical Activity:     Days of Exercise per Week: Not on file    Minutes of Exercise per Session: Not on file   Stress:     Feeling of Stress : Not on file   Social Connections:     Frequency of Communication with Friends and Family: Not on file    Frequency of Social Gatherings with Friends and Family: Not on file    Attends Yarsanism Services: Not on file    Active Member of [address] [place] [address] [employer] or Organizations: Not on file    Attends Club or Organization Meetings: Not on file    Marital Status: Not on file   Intimate Partner Violence:     Fear of Current or Ex-Partner: Not on file    Emotionally Abused: Not on file    Physically Abused: Not on file    Sexually Abused: Not on file   Housing Stability:     Unable to Pay for Housing in the Last Year: Not on file    Number of [place] in the Last Year: Not on file    Unstable Housing in the Last Year: Not on file           Vitals:  Ht 5' 5\" (1.651 m)   Wt 265 lb (120.2 kg)   BMI 44.10 kg/m²    Body mass index is 44.1 kg/m².     ROS     Positive for: Musculoskeletal    Last edited by Jurline Sender on 2022  9:43 AM. (History)              SUBJECTIVE/OBJECTIVE:  Winsome Youssef (: 1960)   New patient presents today with complaint of left foot pain related to an injury sustained on  while walking down the stairs, she fell and injured the left foot. She went to urgent care facility on the same day after work. When she was at work, she had more pain and swelling and the pain became worse so eventually she went to the urgent care facility. Current pain is mild dull pain that comes and goes associated with swelling and bruising. Stair/steps and walking make it worse. She has had improvements with taking Tylenol and time since injury. She has been in a boot provided by urgent care facility. She is a non-smoker, not diabetic. Physical Exam  Pleasant well-nourished obese female, alert and oriented to person, time and place, no acute distress. Antalgic gait, satisfactory weightbearing stance. Left lower extremity/ankle: There is no malalignment or deformity to the lower extremity including the ankle area, there is tenderness at ATFL area, fibula medial malleolus nontender. Ligaments are grossly stable, Achilles tendon intact with negative Pyle test, calf soft nontender. Left foot: No malalignment or deformity, tenderness to palpation dorsal hindfoot over the area of the talus. There is no tenderness at the base of fifth metatarsal, cuboid, Lisfranc joint and forefoot metatarsals. Mild over the navicular near the talonavicular joint. No gross instabilities to the joint. Contralateral foot and ankle exam, nontender, no swelling ligaments grossly stable. Normal weightbearing stance. Neurovascular exam intact for light touch sensation, cap refill, dorsalis pedis pulse palpable, flexion/extension strength 5/5. Skin intact without open wounds, lesions or ulcers, no skin discolorations, normal warmth to skin.       Imaging:    Left ankle AP, lateral and oblique nonweightbearing x-rays brought by patient from outside facility which was better [facility] urgent care dated July 14, 2022 shows an avulsion fracture notable on the lateral view dorsal to the talus, no dislocations, there is a normal ankle mortise, there is mild soft tissue swelling medial and lateral ankle. Satisfactory bone density. These views/x-rays were brought by patient on disc and transition to PACS for review. An electronic signature was used to authenticate this note.   -- Trinity Montero MD

## 2022-07-19 NOTE — LETTER
7/19/2022    Patient: Yara Wyatt   YOB: 1960   Date of Visit: 7/19/2022     Ike Cordero MD  [address]  [place] [address]  Via Fax: [phone]     Jeronimo Mcconnell MD  [address] [address] [address] [address]  Via Fax: [phone]    Dear MD Jeronimo Jennings MD,      Thank you for referring Ms. Yara Wyatt to [facility] for evaluation. My notes for this consultation are attached. If you have questions, please do not hesitate to call me. I look forward to following your patient along with you.       Sincerely,    Jose Whyte MD

## 2022-08-30 ENCOUNTER — OFFICE VISIT (OUTPATIENT)
Dept: ORTHOPEDIC SURGERY | Age: 62
End: 2022-08-30

## 2022-08-30 VITALS — WEIGHT: 265 LBS | HEIGHT: 65 IN | BODY MASS INDEX: 44.15 KG/M2

## 2022-08-30 DIAGNOSIS — S92.152D CLOSED DISPLACED AVULSION FRACTURE OF LEFT TALUS WITH ROUTINE HEALING, SUBSEQUENT ENCOUNTER: Primary | ICD-10-CM

## 2022-08-30 DIAGNOSIS — M79.672 LEFT FOOT PAIN: ICD-10-CM

## 2022-08-30 PROCEDURE — 99212 OFFICE O/P EST SF 10 MIN: CPT | Performed by: ORTHOPAEDIC SURGERY

## 2022-08-30 NOTE — LETTER
8/30/2022    Patient: Bryon Abbott   YOB: 1960   Date of Visit: 8/30/2022     Charlie Zamorano MD  [address]  [address] [address]  Via Fax: [phone]    Dear Charlie Zamorano MD,      Thank you for referring Ms. Bryon Abbott to [facility] for evaluation. My notes for this consultation are attached. If you have questions, please do not hesitate to call me. I look forward to following your patient along with you.       Sincerely,    Adolfo Garcia MD

## 2022-08-30 NOTE — PROGRESS NOTES
Lieutenant Hernández (: 1960) is a 62 y.o. female, patient,here for evaluation of the following   Chief Complaint   Patient presents with    Foot Pain        ASSESSMENT/PLAN:  Below is the assessment and plan developed based on review of pertinent history, physical exam, labs, studies, and medications. 1. Closed displaced avulsion fracture of left talus with routine healing, subsequent encounter  -     REFERRAL TO PHYSICAL THERAPY  2. Left foot pain  -     XR FOOT LT MIN 3 V; Future  -     REFERRAL TO PHYSICAL THERAPY      Patient informed of findings on exam and x-rays today. Appears that the fracture is healing although it is appears that there is still some pain related to the overall sprain of the midfoot, there are no other fractures or dislocations seen on the x-rays but she still has some tenderness around the midfoot joints. She has no gross instability to the foot and joints, she is in a boot and can wean out of boot into more rigid soled shoes or shoes to have semirigid arch supports. I have made a referral to physical therapy today, referral provided to patient and she will make that appointment. If she has persistent pain after 2 months since today's evaluation and after physical therapy program, she will return to see us and if she does return in the future we will obtain weightbearing x-rays of left foot 3 views weightbearing compared to contralateral foot on AP view. Patient agrees with plan. Return if symptoms worsen or fail to improve. Allergies   Allergen Reactions    Fish Containing Products Other (comments)     R/T ALLERGY TESTING-NEVER HAD REACTION    Iodine Rash    Pcn [Penicillins] Rash    Vancomycin Rash       Current Outpatient Medications   Medication Sig    ondansetron (ZOFRAN ODT) 4 mg disintegrating tablet Take 1 Tablet by mouth every eight (8) hours as needed for Nausea or Vomiting.     albuterol (PROVENTIL HFA, VENTOLIN HFA, PROAIR HFA) 90 mcg/actuation inhaler Take 2 Puffs by inhalation every four (4) hours as needed for Wheezing, Shortness of Breath or Cough. lansoprazole (PREVACID) 30 mg capsule Take 30 mg by mouth Daily (before breakfast). cetirizine (ZYRTEC) 10 mg tablet Take 10 mg by mouth.    losartan (COZAAR) 100 mg tablet Take 100 mg by mouth daily. diclofenac EC (VOLTAREN) 75 mg EC tablet Take 75 mg by mouth. amitriptyline (ELAVIL) 50 mg tablet Take 50 mg by mouth nightly. temazepam (RESTORIL) 30 mg capsule Take 30 mg by mouth nightly. levothyroxine (SYNTHROID) 100 mcg tablet Take 125 mcg by mouth nightly. montelukast (SINGULAIR) 10 mg tablet Take 10 mg by mouth nightly. QUEtiapine (SEROQUEL) 50 mg tablet Take 50 mg by mouth nightly. Take at Bedtime as needed for sleep. No current facility-administered medications for this visit.        Past Medical History:   Diagnosis Date    Adverse effect of anesthesia     SOMETIMES SLOW TO WAKE UP     Anxiety     Arrhythmia     MURMUR, PALPITATIONS    Arthritis     OSTEOARTHRITIS IN HANDS    Autoimmune disease ([facility] [address]     HASHIMOTO'S    GERD (gastroesophageal reflux disease)     Heart murmur     HTN (hypertension)     IBS (irritable bowel syndrome)     Ileus ([facility] [address] 2017    Insomnia     Nausea & vomiting     Psychiatric disorder     AND ANXIETY    PUD (peptic ulcer disease)     Thyroid nodule     Vitamin D deficiency        Past Surgical History:   Procedure Laterality Date    HX CATARACT REMOVAL Bilateral     HX CHOLECYSTECTOMY      HX COLONOSCOPY      HX ENDOSCOPY      HX HEART CATHETERIZATION      HX OTHER SURGICAL      bladder slings    HX NONA AND BSO      Total - age 46    HX TONSIL AND ADENOIDECTOMY      HX TUBAL LIGATION      HX UROLOGICAL  05/2016    CYSTOCELE AND RECTOCELE REPAIR, BLADDER SLING       Family History   Problem Relation Age of Onset    Cancer Father         PROSTATE, BLADDER WITH METS    Heart Attack Father     Cancer Mother         oral cancer    Hypertension Mother     OSTEOARTHRITIS Mother     Heart Attack Brother 39    Anesth Problems Neg Hx        Social History     Socioeconomic History    Marital status:      Spouse name: Not on file    Number of children: Not on file    Years of education: Not on file    Highest education level: Not on file   Occupational History    Not on file   Tobacco Use    Smoking status: Never    Smokeless tobacco: Never   Vaping Use    Vaping Use: Never used   Substance and Sexual Activity    Alcohol use: No     Alcohol/week: 0.0 standard drinks    Drug use: No    Sexual activity: Not on file   Other Topics Concern    Not on file   Social History Narrative    Not on file     Social Determinants of Health     Financial Resource Strain: Not on file   Food Insecurity: Not on file   Transportation Needs: Not on file   Physical Activity: Not on file   Stress: Not on file   Social Connections: Not on file   Intimate Partner Violence: Not on file   Housing Stability: Not on file           Vitals:  Ht 5' 5\" (1.651 m)   Wt 265 lb (120.2 kg)   BMI 44.10 kg/m²    Body mass index is 44.1 kg/m². SUBJECTIVE:  Ike Feliciano (: 1960)   Returns today for follow-up the left foot dorsal talus avulsion fracture sustained around 2022. Patient has been immobilized in a boot, she is doing much better but she has occasional pain is still present on the top and side of her foot. Most of this pain also focused to the midfoot area. OBJECTIVE EXAM:     Visit Vitals  Ht 5' 5\" (1.651 m)   Wt 265 lb (120.2 kg)   BMI 44.10 kg/m²       Appearance: Alert, well appearing and pleasant patient who is in no distress, oriented to person, place/time, and who follows commands. This patient is accompanied in the       office by her  self. Psychiatric: Affect and mood are appropriate.  No dementia noted on examination  Musculoskeletal:  LOCATION: Diffuse tenderness around the midfoot of foot - left      Integumentary: No rashes, skin patches, wounds, or abrasions to the right or left legs       Warm and normal color. No regions of expressible drainage. Gait: Normal      Tenderness: No tenderness        Motor/Strength/Tone Exam: Normal       Sensory Exam:   Intact Normal Sensation to ankle/foot      Stability Testing: No anterolateral or varus instability of the Ankle or Subtalar Joints               No peroneal tendon instability noted      ROM: Normal ROM noted to ankle/foot      Contractures: No Achilles or Gastrocnemius Contractures      Calf tenderness: Absent for calf or gastrocnemius muscle regions       Soft, supple, non tender, non taut lower extremity compartment  Alignment:      NEUTRAL Hindfoot,         none Metatarsus Adductus Metatarsus   Wounds/Abrasions:    None present  Extremities:   No embolic phenomena to the toes          No significant edema to the foot and or toes. Lower extremities are warm and appear well perfused    DVT: No evidence of DVT seen on examination at this time     No calf swelling, no tenderness to calf muscles  Lymphatic:  No Evidence of Lymphedema  Vascular: Medial Border of Tibia Region: Edema is not present         Pulses: Dorsalis Pedis &  Posterior Tibial Pulses : Palpable yes        Varicosities Lower Limbs :  None  noted  Neuro: Negative bilateral Straight leg raise (seated position)    See Musculoskeletal section for pertinent individual extremity examination    No abnormal hand/wrist, foot/ankle, or facial/neck tremors. Lower Extremity/Ankle/Foot:  Antalgic gait without boot, mostly normal in boot, satisfactory weightbearing stance. Left lower extremity/ankle: There is no malalignment or deformity, nontender, no swelling, full active and passive range of motion intact similar to contralateral, ligaments grossly stable.   Negative Pyle test, negative ankle squeeze test.    Left foot: There is no malalignment or deformity to the foot, there is no significant swelling, no ecchymosis, erythema, fluctuance. There is some tenderness to palpation around midfoot joints, the base of fifth metatarsal, navicular, cuboid nontender, most tender is over the top of the talar neck. Forefoot metatarsals and toes nontender able to flex and extend toes satisfactory motion and strength. Contralateral lower extremity/ankle /foot exam:  Nontender, no swelling ligaments grossly stable. Normal weightbearing stance. Neurovascular exam is grossly intact, unchanged from previous exam.    Imaging:    XR Results (most recent):  Results from Appointment encounter on 08/30/22    XR FOOT LT MIN 3 V    Narrative  Left foot AP, lateral and oblique nonweightbearing x-rays show the avulsion fracture on top of the dorsal aspect of talus remains unchanged, joint space is satisfactory, no significant widening of joint space. No other fractures or dislocations seen. Normal bone density. An electronic signature was used to authenticate this note.   -- Vanessa Smith MD

## 2022-09-07 ENCOUNTER — HOSPITAL ENCOUNTER (OUTPATIENT)
Dept: PHYSICAL THERAPY | Age: 62
Discharge: HOME | End: 2022-09-07
Payer: COMMERCIAL

## 2022-09-07 PROCEDURE — 97110 THERAPEUTIC EXERCISES: CPT

## 2022-09-07 PROCEDURE — 97016 VASOPNEUMATIC DEVICE THERAPY: CPT

## 2022-09-07 PROCEDURE — 97162 PT EVAL MOD COMPLEX 30 MIN: CPT

## 2022-09-07 NOTE — PROGRESS NOTES
PT INITIAL EVALUATION NOTE 2-15    Patient Name: Jarad Burch  Date:2022  : 1960  [x]  Patient  Verified  Payor: BLUE CROSS / Plan: Mae Allan / Product Type: PPO /    In time: 436  Out time:533  Total Treatment Time (min): 62  Visit #: 1     Treatment Area: Pain in left foot [M79.672]    SUBJECTIVE  Pain Level (0-10 scale): 3/10  Any medication changes, allergies to medications, adverse drug reactions, diagnosis change, or new procedure performed?: [] No    [x] Yes (see summary sheet for update)  Subjective:   Pt reports she fell down her stairs at home on 22 and broke her L foot. She tried to go to work, but pain was severe so she went to urgent care same day as incident. She received X-rays that showed left talus avulsion fracture. She was placed in a walking boot, WBAT for ~7 weeks. She reports she was still experiencing pain while in boot. Noted bruising following injury, but minimal swelling. Pt saw orthopedist on 22 and was discharged from the boot. Was told to wear rigid or semi rigid shoes, but pt notes she does not have any. She has been wearing her slip on  tennis shoes. Pt received FU X-rays at visit, showed fx properly healing. She reports still experiencing pain when walking and occasionally at rest. Pt reports pain is located all the way around talocrural joint line. Some days pain is worst than others. Has difficulty completing stairs due to pain, descending is harder than ascending. Pt reports hx of stress fracture in midfoot of L foot ~6-7 years ago. Felt she had a full recovery and never experienced pain until ~1.5 years ago. Went to see podiatrist due to the pain and was told she had arthritis from previous stress fx. Pt reports no other recent falls. Current functional limitations include: prolonged walking, prolonged standing, stairs.  Pt goals are to \"get better and help with pain\"    PLOF: independent  Mechanism of Injury: fall on 7/14  Previous Treatment/Compliance: n/a  PMHx/Surgical Hx: see chart  Work Hx: - [employer]  Living Situation: 2nd story house   Pt Goals: \"get better and help with pain\"  Barriers: none  Motivation: good        OBJECTIVE/EXAMINATION    OBJECTIVE  Posture: Other Observations:  increased pronation bilateral, decreased medial longitudinal arch bilaterally, L>R  Gait and Functional Mobility:  Pt ambulated into clinic (I) / transfers (I)  DL Heel Raises: >10x, no p! Single limb heel raise test: R = 6 repetitions L = 8 repetitions - muscle fatigue bilat, p! on L  Single limb stance, EO/EC: R = 4 seconds, L = 2 seconds  EC: R = 2 seconds, L = <1 second    Palpation: TTP along left talocrural joint line, TTP in midfoot with 2nd and 3rd metatarsal mobilization      LOWER QUARTER   MUSCLE STRENGTH  KEY        R  L  0 - No Contraction  Hip flexion   4/5  4/5   1- Trace   Hip Abduction   5/5  5/5  2 - Poor   Hip ER    nt  nt  3 - Fair    Hip Extension   nt  nt     4 - Good   Knee flexion   5/5  5/5  5 - Normal   Knee extension  5/5  5/5      Ankle Dorsiflexion  5/5  5/5      Ankle Plantarflexion  5/5  5/5      Ankle Eversion  5/5  4/5, p! Ankle Inversion  5/5  4/5, p! Great toe extension  5/5  5/5    Flexibility: muscle tightness in gastroc soleus complex    Mobility Assessment:   hypomobile with talocrural AP joint mobilizations, noted p!     Ankle DF ROM: R  L  AROM:  9 deg  5 deg   PROM:  10 deg  8 deg         Special Tests:    Anterior drawer: R (-) L (-)   Talar Tilt: R (-) L (+)      Modality rationale: decrease inflammation, decrease pain, and increase tissue extensibility to improve the patients ability to perform functional daily tasks with minimal to no pain   Min Type Additional Details    [] Estim: []Att   []Unatt        []TENS instruct                  []IFC  []Premod   []NMES                     []Other:  []w/US   []w/ice   []w/heat  Position:  Location:    []  Traction: [] Cervical       []Lumbar                       [] Prone          []Supine                       []Intermittent   []Continuous Lbs:  [] before manual  [] after manual  []w/heat    []  Ultrasound: []Continuous   [] Pulsed at:                            []1MHz   []3MHz Location:  W/cm2:    []  Paraffin         Location:  []w/heat    []  Ice     []  Heat  []  Ice massage Position:  Location:    []  Laser  []  Other: Position:  Location:   nt [x]  Vasopneumatic Device Pressure:       [] lo [x] med [] hi   Temperature: coldest   [x] Skin assessment post-treatment:  [x]intact []redness- no adverse reaction    []redness - adverse reaction:     12 min Therapeutic Exercise:  [x] See flow sheet :   Rationale: increase ROM, increase strength, improve balance, and increase proprioception to improve the patients ability to perform functional daily tasks with minimal to no pain.           With   [] TE   [] TA   [] Neuro   [] SC   [] other: Patient Education: [x] Review HEP    [] Progressed/Changed HEP based on:   [] positioning   [] body mechanics   [] transfers   [] heat/ice application    [] other:        Other Objective/Functional Measures: FOTO Functional Measure: 63/100                  Pain Level (0-10 scale) post treatment: 2/10      ASSESSMENT:      [x]  See Plan of [address], PT 9/7/2022

## 2022-09-07 NOTE — THERAPY EVALUATION
[facility]  [address]  [place], [address]  Phone: [phone] Fax: [phone]    Plan of Care/Statement of Necessity for Physical Therapy Services  2-15    Patient name: Morgan Linares  : 1960  Provider#: [id number]  Referral source: Farzaneh Ferraro MD      Medical/Treatment Diagnosis: Pain in left foot [M79.672]     Prior Hospitalization: see medical history     Comorbidities: anxiety, arthritis, asthma, BMI>30, depression, GI Disease, high blood pressure  Prior Level of Function: independent  Medications: Verified on Patient Summary List    Start of Care: 2022      Onset Date: 22       The Plan of Care and following information is based on the information from the initial evaluation. Assessment/ key information: Patient is a 62year old female who presents to physical therapy services due to left ankle pain s/p L talus fx. Patient presents today with functional mobility, muscle endurance, muscle power, and movement impairments. Patient demonstrated decreased lower extremity strength during single heel raise test (R=6, L=8 reps) and assessment, impaired static postural control and proprioceptive awareness during single limb stance exercise (R=4s, L=2s), tenderness to palpation along left talocrural joint line, TTP in midfoot with 2nd and 3rd metatarsal mobilization, limited active/passive ankle dorsiflexion range of motion, and pain limiting daily functional tasks. Patient pain responded favorable to manual intervention and strengthening exercises in todays session, with report of diminished symptoms post-treatment. Patient would benefit from continued skilled physical therapy services to address the impairments listed above to allow for full participation in daily functional tasks such as prolonged walking/standing and stairs safely and with minimal to no pain.       Evaluation Complexity History HIGH Complexity :3+ comorbidities / personal factors will impact the outcome/ POC ; Examination HIGH Complexity : 4+ Standardized tests and measures addressing body structure, function, activity limitation and / or participation in recreation  ;Presentation MEDIUM Complexity : Evolving with changing characteristics  ; Clinical Decision Making MEDIUM Complexity : FOTO score of 26-74  Overall Complexity Rating: MEDIUM    Problem List: pain affecting function, decrease ROM, decrease strength, impaired gait/ balance, decrease ADL/ functional abilitiies, decrease activity tolerance, and decrease flexibility/ joint mobility   Treatment Plan may include any combination of the following: Therapeutic exercise, Therapeutic activities, Neuromuscular re-education, Physical agent/modality, Gait/balance training, Manual therapy, Patient education, Self Care training, Functional mobility training, Home safety training, and Stair training  Patient / Family readiness to learn indicated by: asking questions, trying to perform skills, and interest  Persons(s) to be included in education: patient (P)  Barriers to Learning/Limitations: None  Patient Goal (s): get better and help with pain  Patient Self Reported Health Status: good  Rehabilitation Potential: good    Short Term Goals: To be accomplished in 6-8 treatments:   Patient will demonstrate understanding of and compliance with HEP showing full participation in physical therapy. Patient will improve single limb heel raise test to completing 12 repetitions on each leg showing improving LE strength and functional mobility. Patient will report reduced familiar symptoms by >/= 25% allowing for improving participation in daily tasks. Long Term Goals: To be accomplished in 12-16 treatments:   Patient will report minimal to no pain during prolonged walking (>30 min) to show improving functional mobility and participation in daily tasks.   Patient will improve FOTO score by the [address] of 9 to >/= 72 showing significant improvement in their self-reported level of function. Patient will demonstrate improved single limb postural control >/= 5 sec with eyes open toward improved stability with prolonged walking tasks. Patient will demonstrate 5/5 left ankle strength in all directions with minimal to no pain demonstrating improving functional mobility. Frequency / Duration: Patient to be seen 2 times per week for 12-16 treatments. Patient/ Caregiver education and instruction: self care, activity modification, and exercises    [x]  Plan of care has been reviewed with ANASTASIA Vera, PT 9/7/2022     ________________________________________________________________________    I certify that the above Therapy Services are being furnished while the patient is under my care. I agree with the treatment plan and certify that this therapy is necessary.     Physician's Signature:____________________  Date:____________Time: _________      Farzaneh Ferraro MD

## 2022-09-14 ENCOUNTER — APPOINTMENT (OUTPATIENT)
Dept: PHYSICAL THERAPY | Age: 62
End: 2022-09-14
Payer: COMMERCIAL

## 2023-07-12 ENCOUNTER — HOSPITAL ENCOUNTER (OUTPATIENT)
Facility: HOSPITAL | Age: 63
Discharge: HOME | End: 2023-07-15
Attending: INTERNAL MEDICINE
Payer: COMMERCIAL

## 2023-07-12 DIAGNOSIS — R10.814 LEFT LOWER QUADRANT ABDOMINAL TENDERNESS WITHOUT REBOUND TENDERNESS: ICD-10-CM

## 2023-07-12 DIAGNOSIS — S59.901A INJURY OF RIGHT ELBOW, INITIAL ENCOUNTER: ICD-10-CM

## 2023-07-12 LAB — CREAT BLD-MCNC: 1 MG/DL (ref 0.6–1.3)

## 2023-07-12 PROCEDURE — 82565 ASSAY OF CREATININE: CPT

## 2023-07-12 PROCEDURE — 6360000004 HC RX CONTRAST MEDICATION

## 2023-07-12 PROCEDURE — 73080 X-RAY EXAM OF ELBOW: CPT

## 2023-07-12 PROCEDURE — 74177 CT ABD & PELVIS W/CONTRAST: CPT

## 2023-07-12 RX ADMIN — IOPAMIDOL 100 ML: 755 INJECTION, SOLUTION INTRAVENOUS at 17:14

## 2024-08-19 ENCOUNTER — APPOINTMENT (OUTPATIENT)
Facility: HOSPITAL | Age: 64
End: 2024-08-19
Payer: COMMERCIAL

## 2024-08-19 ENCOUNTER — HOSPITAL ENCOUNTER (EMERGENCY)
Facility: HOSPITAL | Age: 64
Discharge: HOME | End: 2024-08-19
Attending: EMERGENCY MEDICINE
Payer: COMMERCIAL

## 2024-08-19 VITALS
SYSTOLIC BLOOD PRESSURE: 143 MMHG | TEMPERATURE: 98.4 F | WEIGHT: 255.07 LBS | HEIGHT: 65 IN | RESPIRATION RATE: 18 BRPM | BODY MASS INDEX: 42.5 KG/M2 | DIASTOLIC BLOOD PRESSURE: 94 MMHG | OXYGEN SATURATION: 93 % | HEART RATE: 68 BPM

## 2024-08-19 DIAGNOSIS — H53.9 TRANSIENT VISUAL DISTURBANCE: Primary | ICD-10-CM

## 2024-08-19 LAB
ALBUMIN SERPL-MCNC: 3.4 G/DL (ref 3.5–5)
ALBUMIN/GLOB SERPL: 0.9 (ref 1.1–2.2)
ALP SERPL-CCNC: 91 U/L (ref 45–117)
ALT SERPL-CCNC: 14 U/L (ref 12–78)
ANION GAP SERPL CALC-SCNC: 7 MMOL/L (ref 5–15)
AST SERPL-CCNC: 9 U/L (ref 15–37)
BASOPHILS # BLD: 0 K/UL (ref 0–0.1)
BASOPHILS NFR BLD: 0 % (ref 0–1)
BILIRUB SERPL-MCNC: 0.6 MG/DL (ref 0.2–1)
BUN SERPL-MCNC: 13 MG/DL (ref 6–20)
BUN/CREAT SERPL: 12 (ref 12–20)
CALCIUM SERPL-MCNC: 8.8 MG/DL (ref 8.5–10.1)
CHLORIDE SERPL-SCNC: 106 MMOL/L (ref 97–108)
CO2 SERPL-SCNC: 26 MMOL/L (ref 21–32)
CREAT SERPL-MCNC: 1.05 MG/DL (ref 0.55–1.02)
DIFFERENTIAL METHOD BLD: NORMAL
EOSINOPHIL # BLD: 0.1 K/UL (ref 0–0.4)
EOSINOPHIL NFR BLD: 1 % (ref 0–7)
ERYTHROCYTE [DISTWIDTH] IN BLOOD BY AUTOMATED COUNT: 12.8 % (ref 11.5–14.5)
GLOBULIN SER CALC-MCNC: 4 G/DL (ref 2–4)
GLUCOSE BLD STRIP.AUTO-MCNC: 168 MG/DL (ref 65–117)
GLUCOSE SERPL-MCNC: 160 MG/DL (ref 65–100)
HCT VFR BLD AUTO: 36.3 % (ref 35–47)
HGB BLD-MCNC: 12.4 G/DL (ref 11.5–16)
IMM GRANULOCYTES # BLD AUTO: 0 K/UL (ref 0–0.04)
IMM GRANULOCYTES NFR BLD AUTO: 0 % (ref 0–0.5)
LYMPHOCYTES # BLD: 2.9 K/UL (ref 0.8–3.5)
LYMPHOCYTES NFR BLD: 32 % (ref 12–49)
MCH RBC QN AUTO: 29.4 PG (ref 26–34)
MCHC RBC AUTO-ENTMCNC: 34.2 G/DL (ref 30–36.5)
MCV RBC AUTO: 86 FL (ref 80–99)
MONOCYTES # BLD: 0.4 K/UL (ref 0–1)
MONOCYTES NFR BLD: 5 % (ref 5–13)
NEUTS SEG # BLD: 5.5 K/UL (ref 1.8–8)
NEUTS SEG NFR BLD: 62 % (ref 32–75)
NRBC # BLD: 0 K/UL (ref 0–0.01)
NRBC BLD-RTO: 0 PER 100 WBC
PLATELET # BLD AUTO: 318 K/UL (ref 150–400)
PMV BLD AUTO: 9.8 FL (ref 8.9–12.9)
POTASSIUM SERPL-SCNC: 3.7 MMOL/L (ref 3.5–5.1)
PROT SERPL-MCNC: 7.4 G/DL (ref 6.4–8.2)
RBC # BLD AUTO: 4.22 M/UL (ref 3.8–5.2)
SERVICE CMNT-IMP: ABNORMAL
SODIUM SERPL-SCNC: 139 MMOL/L (ref 136–145)
TSH SERPL DL<=0.05 MIU/L-ACNC: 1.19 UIU/ML (ref 0.36–3.74)
WBC # BLD AUTO: 9 K/UL (ref 3.6–11)

## 2024-08-19 PROCEDURE — 99284 EMERGENCY DEPT VISIT MOD MDM: CPT

## 2024-08-19 PROCEDURE — 70551 MRI BRAIN STEM W/O DYE: CPT

## 2024-08-19 PROCEDURE — 96375 TX/PRO/DX INJ NEW DRUG ADDON: CPT

## 2024-08-19 PROCEDURE — 82962 GLUCOSE BLOOD TEST: CPT

## 2024-08-19 PROCEDURE — 6360000002 HC RX W HCPCS: Performed by: EMERGENCY MEDICINE

## 2024-08-19 PROCEDURE — 70450 CT HEAD/BRAIN W/O DYE: CPT

## 2024-08-19 PROCEDURE — 84443 ASSAY THYROID STIM HORMONE: CPT

## 2024-08-19 PROCEDURE — 80053 COMPREHEN METABOLIC PANEL: CPT

## 2024-08-19 PROCEDURE — 85025 COMPLETE CBC W/AUTO DIFF WBC: CPT

## 2024-08-19 PROCEDURE — 96374 THER/PROPH/DIAG INJ IV PUSH: CPT

## 2024-08-19 PROCEDURE — 36415 COLL VENOUS BLD VENIPUNCTURE: CPT

## 2024-08-19 RX ORDER — LORAZEPAM 2 MG/ML
1 INJECTION INTRAMUSCULAR
Status: COMPLETED | OUTPATIENT
Start: 2024-08-19 | End: 2024-08-19

## 2024-08-19 RX ORDER — KETOROLAC TROMETHAMINE 30 MG/ML
30 INJECTION, SOLUTION INTRAMUSCULAR; INTRAVENOUS
Status: COMPLETED | OUTPATIENT
Start: 2024-08-19 | End: 2024-08-19

## 2024-08-19 RX ADMIN — LORAZEPAM 1 MG: 2 INJECTION INTRAMUSCULAR; INTRAVENOUS at 17:33

## 2024-08-19 RX ADMIN — KETOROLAC TROMETHAMINE 30 MG: 30 INJECTION, SOLUTION INTRAMUSCULAR at 18:47

## 2024-08-19 ASSESSMENT — LIFESTYLE VARIABLES
HOW MANY STANDARD DRINKS CONTAINING ALCOHOL DO YOU HAVE ON A TYPICAL DAY: PATIENT DOES NOT DRINK
HOW OFTEN DO YOU HAVE A DRINK CONTAINING ALCOHOL: NEVER

## 2024-08-19 ASSESSMENT — PAIN DESCRIPTION - DESCRIPTORS: DESCRIPTORS: ACHING

## 2024-08-19 ASSESSMENT — PAIN SCALES - GENERAL
PAINLEVEL_OUTOF10: 5
PAINLEVEL_OUTOF10: 0

## 2024-08-19 ASSESSMENT — PAIN DESCRIPTION - LOCATION: LOCATION: HEAD

## 2024-08-19 NOTE — ED TRIAGE NOTES
Pt presents ambulatory to ED via triage for c/o right eye heaviness and changes in vision. Pt reports this has been off/on for a couple of weeks but around 4 pm yesterday the symptoms came on worse. Pt contacted her PCP and was referred to ED for stroke eval.

## 2024-08-19 NOTE — ED NOTES
Discharge paperwork reviewed and provided to pt. Time was given to ask any questions or concerns which there were none att.        No

## 2024-08-19 NOTE — PROGRESS NOTES
Please complete screening and sign electronically or fax to [phone].     Telemetry order must be changed to allow the patient to leave the unit without telemetry.     Telemetry box cannot come to MRI with the patient.     Please call MRI at [phone] when this has been done.     If this patient needs monitored while off the unit, please call MRI at [phone] to coordinate a time for an RN to accompany the patient to MRI.

## 2024-08-19 NOTE — ED PROVIDER NOTES
5/5 throughout, sensation intact, no cerebellar deficits.  NIHSS 0.          DIAGNOSTIC RESULTS   LABS:     Labs Reviewed   COMPREHENSIVE METABOLIC PANEL - Abnormal; Notable for the following components:       Result Value    Glucose 160 (*)     Creatinine 1.05 (*)     Est, Glom Filt Rate 60 (*)     AST 9 (*)     Albumin 3.4 (*)     Albumin/Globulin Ratio 0.9 (*)     All other components within normal limits   POCT GLUCOSE - Abnormal; Notable for the following components:    POC Glucose 168 (*)     All other components within normal limits   CBC WITH AUTO DIFFERENTIAL   TSH          EKG: If performed, independent interpretation documented below in the MDM section     RADIOLOGY:  Non-plain film images such as CT, Ultrasound and MRI are read by the radiologist. Plain radiographic images are visualized and preliminarily interpreted by the ED Provider with the findings documented in the MDM section.     Interpretation per the Radiologist below, if available at the time of this note:     [unfilled]   CT HEAD WO CONTRAST  MRI BRAIN WO CONTRAST       PROCEDURES   Unless otherwise noted below, none  Procedures     CRITICAL CARE TIME   0    EMERGENCY DEPARTMENT COURSE and DIFFERENTIAL DIAGNOSIS/MDM   Vitals:    Vitals:    08/19/24 1430 08/19/24 1500 08/19/24 1700 08/19/24 1900   BP: (!) 159/84 (!) 187/90 136/74 (!) 143/94   Pulse: 76 69 68    Resp:       Temp:       TempSrc:       SpO2: 99% 99% 97% 93%   Weight:       Height:            Patient was given the following medications:  Medications   LORazepam (ATIVAN) injection 1 mg (1 mg IntraVENous Given 8/19/24 1733)   ketorolac (TORADOL) injection 30 mg (30 mg IntraVENous Given 8/19/24 1847)       Medical Decision Making  Amount and/or Complexity of Data Reviewed  Labs: ordered. Decision-making details documented in ED Course.  Radiology: ordered. Decision-making details documented in ED Course.    Risk  Prescription drug management.      63-year-old female presenting to

## 2024-09-17 ENCOUNTER — HOSPITAL ENCOUNTER (OUTPATIENT)
Facility: HOSPITAL | Age: 64
Discharge: HOME | End: 2024-09-20
Attending: INTERNAL MEDICINE
Payer: COMMERCIAL

## 2024-09-17 DIAGNOSIS — Z79.1 ENCOUNTER FOR LONG-TERM (CURRENT) USE OF NON-STEROIDAL ANTI-INFLAMMATORIES: ICD-10-CM

## 2024-09-17 DIAGNOSIS — I10 ESSENTIAL (PRIMARY) HYPERTENSION: ICD-10-CM

## 2024-09-17 DIAGNOSIS — N18.30 STAGE 3 CHRONIC KIDNEY DISEASE, UNSPECIFIED WHETHER STAGE 3A OR 3B CKD (HCC): ICD-10-CM

## 2024-09-17 DIAGNOSIS — R80.9 PROTEINURIA, UNSPECIFIED TYPE: ICD-10-CM

## 2024-09-17 PROCEDURE — 76770 US EXAM ABDO BACK WALL COMP: CPT

## 2024-11-21 ENCOUNTER — HOSPITAL ENCOUNTER (OUTPATIENT)
Facility: HOSPITAL | Age: 64
Discharge: HOME | End: 2024-11-21
Attending: STUDENT IN AN ORGANIZED HEALTH CARE EDUCATION/TRAINING PROGRAM
Payer: COMMERCIAL

## 2024-11-21 DIAGNOSIS — R90.89 ABNORMAL COMPUTERIZED TOMOGRAPHY OF BRAIN: ICD-10-CM

## 2024-11-21 PROCEDURE — 70544 MR ANGIOGRAPHY HEAD W/O DYE: CPT

## (undated) DEVICE — LIGHT HANDLE: Brand: DEVON

## (undated) DEVICE — REM POLYHESIVE ADULT PATIENT RETURN ELECTRODE: Brand: VALLEYLAB

## (undated) DEVICE — DEVON™ KNEE AND BODY STRAP 60" X 3" (1.5 M X 7.6 CM): Brand: DEVON

## (undated) DEVICE — PROGRAMMER NEUROSTIMULATOR PT FOR URIN CTRL INTERSTIM ICON

## (undated) DEVICE — STERILE POLYISOPRENE POWDER-FREE SURGICAL GLOVES: Brand: PROTEXIS

## (undated) DEVICE — INFECTION CONTROL KIT SYS

## (undated) DEVICE — TOWEL SURG W17XL27IN STD BLU COT NONFENESTRATED PREWASHED

## (undated) DEVICE — (D)SYR 10ML 1/5ML GRAD NSAF -- PKGING CHANGE USE ITEM 338027

## (undated) DEVICE — HANDLE LT SNAP ON ULT DURABLE LENS FOR TRUMPF ALC DISPOSABLE

## (undated) DEVICE — KENDALL SCD EXPRESS SLEEVES, KNEE LENGTH, MEDIUM: Brand: KENDALL SCD

## (undated) DEVICE — 1200 GUARD II KIT W/5MM TUBE W/O VAC TUBE: Brand: GUARDIAN

## (undated) DEVICE — NEEDLE HYPO 25GA L1.5IN BVL ORIENTED ECLIPSE

## (undated) DEVICE — SURGICAL PROCEDURE PACK BASIN MAJ SET CUST NO CAUT

## (undated) DEVICE — DRAPE,REIN 53X77,STERILE: Brand: MEDLINE

## (undated) DEVICE — SUTURE MCRYL SZ 4-0 L18IN ABSRB UD L19MM PS-2 3/8 CIR PRIM Y496G

## (undated) DEVICE — ASTOUND STANDARD SURGICAL GOWN, XL: Brand: CONVERTORS

## (undated) DEVICE — SOLUTION IRRIG 1000ML H2O STRL BLT

## (undated) DEVICE — 3M™ TEGADERM™ TRANSPARENT FILM DRESSING FRAME STYLE, 1624W, 2-3/8 IN X 2-3/4 IN (6 CM X 7 CM), 100/CT 4CT/CASE: Brand: 3M™ TEGADERM™

## (undated) DEVICE — DRAPE XR C ARM 41X74IN LF --

## (undated) DEVICE — LEAD KT INTRO INTERSTIM --

## (undated) DEVICE — NEEDLE HYPO 25GA L5/8IN ORNG HUB S STL LATCH BVL UP

## (undated) DEVICE — SUTURE VCRL SZ 2-0 L27IN ABSRB UD L26MM SH 1/2 CIR J417H

## (undated) DEVICE — DBD-PACK,LAPAROTOMY,2 REINFORCED GOWNS: Brand: MEDLINE

## (undated) DEVICE — ROCKER SWITCH PENCIL BLADE ELECTRODE, HOLSTER: Brand: EDGE

## (undated) DEVICE — ANTENNA PT PRGMR EXT DETACH RECHRG DBS LEGEND